# Patient Record
Sex: FEMALE | Race: WHITE | Employment: UNEMPLOYED | URBAN - METROPOLITAN AREA
[De-identification: names, ages, dates, MRNs, and addresses within clinical notes are randomized per-mention and may not be internally consistent; named-entity substitution may affect disease eponyms.]

---

## 2019-02-18 LAB
ALBUMIN SERPL-MCNC: 4.8 G/DL
ALP BLD-CCNC: 81 U/L
ALT SERPL-CCNC: 23 U/L
ANION GAP SERPL CALCULATED.3IONS-SCNC: 1.4 MMOL/L
AST SERPL-CCNC: 20 U/L
BILIRUB SERPL-MCNC: 0.8 MG/DL (ref 0.1–1.4)
BUN BLDV-MCNC: 14 MG/DL
CALCIUM SERPL-MCNC: 10.1 MG/DL
CHLORIDE BLD-SCNC: 100 MMOL/L
CHOLESTEROL, TOTAL: 184 MG/DL
CHOLESTEROL/HDL RATIO: 2.6
CO2: 26 MMOL/L
CREAT SERPL-MCNC: 0.7 MG/DL
GFR CALCULATED: 101
GLUCOSE BLD-MCNC: 91 MG/DL
HDLC SERPL-MCNC: 70 MG/DL (ref 35–70)
LDL CHOLESTEROL CALCULATED: 100 MG/DL (ref 0–160)
POTASSIUM SERPL-SCNC: 4.3 MMOL/L
SODIUM BLD-SCNC: 140 MMOL/L
TOTAL PROTEIN: 8.2
TRIGL SERPL-MCNC: 71 MG/DL
VLDLC SERPL CALC-MCNC: 14 MG/DL

## 2019-09-27 DIAGNOSIS — F41.9 ANXIETY: ICD-10-CM

## 2019-09-27 RX ORDER — NAPROXEN 500 MG/1
500 TABLET ORAL
COMMUNITY

## 2019-09-27 RX ORDER — LORAZEPAM 0.5 MG/1
0.5 TABLET ORAL 3 TIMES DAILY PRN
COMMUNITY
End: 2021-04-23 | Stop reason: SDUPTHER

## 2019-09-27 RX ORDER — MONTELUKAST SODIUM 10 MG/1
10 TABLET ORAL NIGHTLY
COMMUNITY
End: 2021-04-23 | Stop reason: ALTCHOICE

## 2019-09-27 RX ORDER — FLUTICASONE PROPIONATE 50 MCG
1 SPRAY, SUSPENSION (ML) NASAL DAILY
COMMUNITY

## 2020-11-09 ENCOUNTER — TELEPHONE (OUTPATIENT)
Dept: FAMILY MEDICINE CLINIC | Age: 50
End: 2020-11-09

## 2020-11-09 NOTE — TELEPHONE ENCOUNTER
Please call patient ---patient tested positive for COVID (at [facility] has some questions, could you please call her?

## 2020-11-11 ENCOUNTER — TELEPHONE (OUTPATIENT)
Dept: FAMILY MEDICINE CLINIC | Age: 50
End: 2020-11-11

## 2020-11-11 ENCOUNTER — APPOINTMENT (OUTPATIENT)
Dept: GENERAL RADIOLOGY | Age: 50
End: 2020-11-11
Payer: COMMERCIAL

## 2020-11-11 ENCOUNTER — HOSPITAL ENCOUNTER (EMERGENCY)
Age: 50
Discharge: HOME | End: 2020-11-11
Attending: EMERGENCY MEDICINE
Payer: COMMERCIAL

## 2020-11-11 ENCOUNTER — VIRTUAL VISIT (OUTPATIENT)
Dept: FAMILY MEDICINE CLINIC | Age: 50
End: 2020-11-11
Payer: COMMERCIAL

## 2020-11-11 VITALS
DIASTOLIC BLOOD PRESSURE: 95 MMHG | WEIGHT: 160 LBS | BODY MASS INDEX: 25.71 KG/M2 | OXYGEN SATURATION: 100 % | SYSTOLIC BLOOD PRESSURE: 148 MMHG | RESPIRATION RATE: 18 BRPM | HEART RATE: 98 BPM | TEMPERATURE: 98.3 F | HEIGHT: 66 IN

## 2020-11-11 PROCEDURE — 99284 EMERGENCY DEPT VISIT MOD MDM: CPT

## 2020-11-11 PROCEDURE — 99213 OFFICE O/P EST LOW 20 MIN: CPT | Performed by: PHYSICIAN ASSISTANT

## 2020-11-11 PROCEDURE — 71045 X-RAY EXAM CHEST 1 VIEW: CPT

## 2020-11-11 PROCEDURE — 6360000002 HC RX W HCPCS: Performed by: EMERGENCY MEDICINE

## 2020-11-11 RX ORDER — IPRATROPIUM BROMIDE AND ALBUTEROL SULFATE 2.5; .5 MG/3ML; MG/3ML
1 SOLUTION RESPIRATORY (INHALATION) ONCE
Status: DISCONTINUED | OUTPATIENT
Start: 2020-11-11 | End: 2020-11-11

## 2020-11-11 RX ORDER — DEXAMETHASONE 4 MG/1
8 TABLET ORAL EVERY 12 HOURS SCHEDULED
Status: DISCONTINUED | OUTPATIENT
Start: 2020-11-11 | End: 2020-11-11 | Stop reason: HOSPADM

## 2020-11-11 RX ORDER — METHYLPREDNISOLONE 4 MG/1
TABLET ORAL
Qty: 1 KIT | Refills: 0 | Status: SHIPPED | OUTPATIENT
Start: 2020-11-11 | End: 2020-11-17

## 2020-11-11 RX ADMIN — DEXAMETHASONE 8 MG: 4 TABLET ORAL at 20:46

## 2020-11-11 ASSESSMENT — ENCOUNTER SYMPTOMS
VOMITING: 0
RHINORRHEA: 1
GASTROINTESTINAL NEGATIVE: 1
SPUTUM PRODUCTION: 0
SINUS PRESSURE: 0
ABDOMINAL PAIN: 0
RHINORRHEA: 0
CHEST TIGHTNESS: 0
NAUSEA: 0
SINUS PAIN: 0
DIARRHEA: 0
EYE PAIN: 0
CONSTIPATION: 0
SORE THROAT: 0
CHEST TIGHTNESS: 0
EYES NEGATIVE: 1
VOMITING: 0
COUGH: 0
SINUS PAIN: 0
BACK PAIN: 0
SHORTNESS OF BREATH: 1
COUGH: 1
NAUSEA: 0
EYE REDNESS: 0
SHORTNESS OF BREATH: 1
ABDOMINAL PAIN: 0
SINUS PRESSURE: 0
FACIAL SWELLING: 0
SORE THROAT: 0
WHEEZING: 1
EYE DISCHARGE: 0

## 2020-11-11 NOTE — PROGRESS NOTES
2020    TELEHEALTH EVALUATION -- Audio/Visual (During [facility]UARC-44 public health emergency)    HPI:    Nat Almeida (:  1970) has requested an audio/video evaluation for the following concern(s):    Patient was tested for COVID on 20 at [facility] and it came back positive. Patient notes symptoms consisting of achiness, congestion, nasal congestion, shortness of breath and loss of taste and smell for the past 2 weeks. Overall feels better except for the shortness of breath which has gotten worse. Earlier today she had an episode where she felt very winded and lightheaded as well. She has h/o mild asthma that is currently only treated with albuterol. She did take her albuterol but did not help much with her symptoms. She feels better when resting. Never had fever. She is drinking plenty of fluids. Evaluation to date: none  Treatment to date: sudafed and mucinex but has not taken any today      Review of Systems   Constitutional: Negative for chills and fever. HENT: Positive for congestion. Negative for ear pain, postnasal drip, rhinorrhea, sinus pressure, sinus pain and sore throat. Respiratory: Positive for shortness of breath. Negative for cough and chest tightness. Cardiovascular: Negative for chest pain. Gastrointestinal: Negative for abdominal pain, nausea and vomiting. Musculoskeletal: Positive for myalgias. Neurological: Positive for light-headedness. Negative for headaches. Prior to Visit Medications    Medication Sig Taking? Authorizing Provider   fluticasone (ARNUITY ELLIPTA) 100 MCG/ACT AEPB Inhale into the lungs  Historical Provider, MD   LORazepam (ATIVAN) 0.5 MG tablet Take 0.5 mg by mouth 3 times daily as needed for Anxiety.   Historical Provider, MD   fluticasone (FLONASE) 50 MCG/ACT nasal spray 1 spray by Each Nostril route daily  Historical Provider, MD   naproxen (NAPROSYN) 500 MG tablet Take 500 mg by mouth 3 times daily (with meals)  Historical Provider, MD montelukast (SINGULAIR) 10 MG tablet Take 10 mg by mouth nightly  Historical Provider, MD       Social History     Tobacco Use    Smoking status: Former Smoker    Smokeless tobacco: Never Used   Substance Use Topics    Alcohol use: Yes     Comment: occ    Drug use: Never            PHYSICAL EXAMINATION:  [ INSTRUCTIONS:  \"[x]\" Indicates a positive item  \"[]\" Indicates a negative item  -- DELETE ALL ITEMS NOT EXAMINED]  Vital Signs: (As obtained by patient/caregiver or practitioner observation)    Blood pressure- 1st reading 139/87, 2nd reading 142/89   Heart rate- 105  Respiratory rate-      Temperature-    Pulse oximetry-     Constitutional:   [] Appears well-developed and well-nourished   [] No apparent distress    [x] Abnormal- appears ill but non-toxic    Mental status  [x] Alert and awake    [x] Oriented to person/place/time   [x]Able to follow commands      Eyes:  EOM    [x]  Normal  [] Abnormal-  Sclera  [x]  Normal  [] Abnormal -  Discharge [x]  None visible    [] Abnormal -    HENT:   [x] Normocephalic, atraumatic. [x] Mouth/Throat: Mucous membranes are moist.    [] Abnormal     External Ears   [x] Normal    [] Abnormal-     Neck:   [x] No visualized mass     Pulmonary/Chest:   [x] Respiratory effort normal.    [x] No visualized signs of difficulty breathing or respiratory distress        [] Abnormal-      Musculoskeletal:     [] Normal gait with no signs of ataxia   [x] Normal range of motion of neck        [] Abnormal-       Neurological:          [x] No Facial Asymmetry (Cranial nerve 7 motor function) (limited exam to video visit)     [x] No gaze palsy        [] Abnormal-         Skin:         [x] No significant exanthematous lesions or discoloration noted on facial skin         [] Abnormal-            Psychiatric:         [x] Normal Affect   [x] No Hallucinations        [] Abnormal-     Other pertinent observable physical exam findings-       ASSESSMENT/PLAN:  1. COVID-19  2.  SOB (shortness of breath)  3. Tachycardia  Discussed patient with Dr. Jaxson Dawson who recommended her be evaluated at the emergency department as he agreed that her worsening shortness of breath, elevated heart rate, and elevated blood pressure 10 days into the Covid illness could be a sign of post Covid complications. Patient was agreeable to go to the emergency department to rule out hypercoagulability, pulmonary embolism, pneumonia, inflammatory reaction. As explained at this point time they do not recommend any specific outpatient treatments such as steroids or antibiotics and that the recommended treatments are all once need to be administered in the hospital under monitoring. No follow-ups on file. Nat Almeida is a 44 y.o. female being evaluated by a Virtual Visit (video visit) encounter to address concerns as mentioned above. A caregiver was present when appropriate. Due to this being a TeleHealth encounter (During [facility]- public health emergency), evaluation of the following organ systems was limited: Vitals/Constitutional/EENT/Resp/CV/GI//MS/Neuro/Skin/Heme-Lymph-Imm. Pursuant to the emergency declaration under the [address] [place] [address], [address] [place] [address] authority and the [employer] and [employer] Act, this Virtual Visit was conducted with patient's (and/or legal guardian's) consent, to reduce the patient's risk of exposure to COVID-19 and provide necessary medical care. The patient (and/or legal guardian) has also been advised to contact this office for worsening conditions or problems, and seek emergency medical treatment and/or call 911 if deemed necessary. Services were provided through a video synchronous discussion virtually to substitute for in-person clinic visit. Patient and provider were located at their individual homes. --Tate Red PA-C on 11/11/2020 at 5:48 PM    An electronic signature was used to authenticate this note.

## 2020-11-12 ENCOUNTER — CARE COORDINATION (OUTPATIENT)
Dept: CARE COORDINATION | Age: 50
End: 2020-11-12

## 2020-11-12 NOTE — ED NOTES
Bed: E09  Expected date:   Expected time:   Means of arrival:   Comments:  COVID cleaning at [address] [place] [address]  11/11/20 1905

## 2020-11-12 NOTE — CARE COORDINATION
Patient contacted regarding recent visit for viral symptoms. This Annette Alvin contacted the patient by telephone to perform post discharge call. Verified name and  with patient as identifiers. Provided introduction to self, and reason for call due to viral symptoms of infection and/or exposure to COVID-19. Call within 2 business days of discharge: Yes       Patient presented to emergency department/flu clinic with complaints of viral symptoms/exposure to COVID. Patient reports symptoms are the same. Due to no new or worsening symptoms the RN CTN/ACTIANA was not notified for escalation. Discussed exposure protocols and quarantine with CDC Guidelines What To Do If You Are Sick    Patient was given an opportunity for questions and concerns. Stay home except to get medical care    Separate yourself from other people and animals in your home    Call ahead before visiting your doctor    Wear a facemask    Cover your coughs and sneezes    Clean your hands often    Avoid sharing personal household items    Clean all high-touch surfaces everyday    Monitor your symptoms  Seek prompt medical attention if your illness is worsening (e.g., difficulty breathing). Before seeking care, call your healthcare provider and tell them that you have, or are being evaluated for, COVID-19. Put on a facemask before you enter the facility. These steps will help the healthcare provider's office to keep other people in the office or waiting room from getting infected or exposed. Ask your healthcare provider to call the local or On license of [facility] health department. Persons who are placed under If you have a medical emergency and need to call 911, notify the dispatch personnel that you have, or are being evaluated for COVID-19. If possible, put on a facemask before emergency medical services arrive.     The patient agrees to contact the [facility] exposure line [phone], local health department [place] Department of Health: ([phone]) and PCP office for questions related to their healthcare. Author provided contact information for future reference. Patient/family/caregiver given information for [employer] and agrees to enroll no  Patient's preferred e-mail:    Patient's preferred phone number:   Based on Loop alert triggers, patient will be contacted by nurse care manager for worsening symptoms. Spoke to pt, pt is feeling about the same. Pt has not scheduled a follow up appt with PCP. Pt declined [place] [address] resources but was given the [facility] resource. Pt declined follow up appt next week but thank author for calling.      Fiorella Vera  [phone]

## 2020-11-12 NOTE — ED PROVIDER NOTES
[facility] ENCOUNTER      Pt Name: Cayetano Dunn  MRN: [id number]  [place] 1970  Date of evaluation: 11/11/2020  Provider: Tino Erazo DO    CHIEF COMPLAINT       Chief Complaint   Patient presents with    Shortness of Breath         HISTORY OF PRESENT ILLNESS      Cayetano Dunn is a 44 y.o. female who presents to the emergency department  for   Chief Complaint   Patient presents with    Shortness of Breath       78-year-old female presents emergency department with chief complaint of dyspnea that occurred after a shower this evening. Patient reports dyspnea lasted for approximately 30 minutes and is now resolved. Patient tested positive for COVID-19 1 week ago. Patient denies fever, chest pain or other associated symptoms. The history is provided by the patient and medical records. No  was used. Shortness of Breath   Severity:  Moderate  Onset quality:  Sudden  Duration:  30 minutes  Timing:  Sporadic  Progression:  Partially resolved  Chronicity:  New  Context: URI    Relieved by:  None tried  Worsened by:  Nothing  Ineffective treatments:  None tried  Associated symptoms: cough and wheezing    Associated symptoms: no abdominal pain, no chest pain, no fever, no headaches, no rash, no sore throat, no sputum production and no vomiting    Risk factors: no obesity, no oral contraceptive use and no tobacco use          Nursing Notes, Triage Notes & Vital Signs were reviewed. REVIEW OF SYSTEMS    (2-9 systems for level 4, 10 or more for level 5)     Review of Systems   Constitutional: Negative. Negative for chills, fatigue and fever. HENT: Positive for congestion and rhinorrhea. Negative for dental problem, facial swelling, nosebleeds, postnasal drip, sinus pressure, sinus pain and sore throat. Eyes: Negative. Negative for pain, discharge, redness and visual disturbance.    Respiratory: Positive for cough, shortness of breath and wheezing. Negative for sputum production and chest tightness. Cardiovascular: Negative. Negative for chest pain and palpitations. Gastrointestinal: Negative. Negative for abdominal pain, constipation, diarrhea, nausea and vomiting. Genitourinary: Negative. Negative for dysuria, flank pain, frequency, hematuria and urgency. Musculoskeletal: Negative. Negative for arthralgias, back pain and myalgias. Skin: Negative. Negative for rash. Neurological: Negative. Negative for dizziness, speech difficulty, light-headedness, numbness and headaches. Psychiatric/Behavioral: Negative. Negative for agitation, confusion and self-injury. The patient is not nervous/anxious. All other systems reviewed and are negative. Except as noted above the remainder of the review of systems was reviewed and negative. PAST MEDICAL HISTORY     Past Medical History:   Diagnosis Date    Anxiety     Asthma        Prior to Admission medications    Medication Sig Start Date End Date Taking? Authorizing Provider   methylPREDNISolone (MEDROL DOSEPACK) 4 MG tablet Take by mouth. 11/11/20 11/17/20 Yes Mercedes Kirk, DO   fluticasone (ARNUITY ELLIPTA) 100 MCG/ACT AEPB Inhale into the lungs    Historical Provider, MD   LORazepam (ATIVAN) 0.5 MG tablet Take 0.5 mg by mouth 3 times daily as needed for Anxiety. Historical Provider, MD   fluticasone (FLONASE) 50 MCG/ACT nasal spray 1 spray by Each Nostril route daily    Historical Provider, MD   naproxen (NAPROSYN) 500 MG tablet Take 500 mg by mouth 3 times daily (with meals)    Historical Provider, MD   montelukast (SINGULAIR) 10 MG tablet Take 10 mg by mouth nightly    Historical Provider, MD        Patient Active Problem List   Diagnosis    Anxiety    Asthma         SURGICAL HISTORY     History reviewed. No pertinent surgical history.       CURRENT MEDICATIONS       Discharge Medication List as of 11/11/2020  8:32 PM      CONTINUE these medications which have NOT CHANGED    Details   fluticasone (ARNUITY ELLIPTA) 100 MCG/ACT AEPB Inhale into the lungsHistorical Med      LORazepam (ATIVAN) 0.5 MG tablet Take 0.5 mg by mouth 3 times daily as needed for Anxiety. Historical Med      fluticasone (FLONASE) 50 MCG/ACT nasal spray 1 spray by Each Nostril route dailyHistorical Med      naproxen (NAPROSYN) 500 MG tablet Take 500 mg by mouth 3 times daily (with meals)Historical Med      montelukast (SINGULAIR) 10 MG tablet Take 10 mg by mouth nightlyHistorical Med             ALLERGIES     Patient has no known allergies.     FAMILY HISTORY       Family History   Problem Relation Age of Onset    Cancer Mother           SOCIAL HISTORY       Social History     Socioeconomic History    Marital status:      Spouse name: None    Number of children: None    Years of education: None    Highest education level: None   Occupational History    None   Social Needs    Financial resource strain: None    Food insecurity     Worry: None     Inability: None    Transportation needs     Medical: None     Non-medical: None   Tobacco Use    Smoking status: Former Smoker    Smokeless tobacco: Never Used   Substance and Sexual Activity    Alcohol use: Yes     Comment: occ    Drug use: Never    Sexual activity: None   Lifestyle    Physical activity     Days per week: None     Minutes per session: None    Stress: None   Relationships    Social connections     Talks on phone: None     Gets together: None     Attends [employer] service: None     Active member of club or organization: None     Attends meetings of clubs or organizations: None     Relationship status: None    Intimate partner violence     Fear of current or ex partner: None     Emotionally abused: None     Physically abused: None     Forced sexual activity: None   Other Topics Concern    None   Social History Narrative    None       SCREENINGS    [place] Coma Scale  Eye Opening: Spontaneous  Best Verbal Response: refill takes less than 2 seconds. Coloration: Skin is not pale. Findings: No erythema or rash. Neurological:      Mental Status: She is alert and oriented to person, place, and time. Cranial Nerves: No cranial nerve deficit. Sensory: No sensory deficit. Motor: No abnormal muscle tone. Coordination: Coordination normal.      Deep Tendon Reflexes: Reflexes normal.   Psychiatric:         Behavior: Behavior normal.         Thought Content: Thought content normal.         Judgment: Judgment normal.         DIAGNOSTIC RESULTS     Labs Reviewed - No data to display       EKG: All EKG's are interpreted by the Emergency Department Physician who either signs or Co-signs this chart in the absence of a cardiologist.       EKG Interpretation    Interpreted by emergency department physician    Adalid Sawyer:     Non-plain film images such as CT, Ultrasound and MRI are read by the radiologist. Plain radiographic images are visualized and preliminarily interpreted by the emergency physician. Interpretation per the Radiologist below, if available at the time of this note:    XR CHEST PORTABLE   Final Result   No acute cardiopulmonary abnormality identified. Streaky atelectasis and/or scarring in the right mid lung and left lung base. ED BEDSIDE ULTRASOUND:   Performed by ED Physician Loretta Hatch DO       LABS:  Labs Reviewed - No data to display    All other labs were within normal range or not returned as of this dictation.     EMERGENCY DEPARTMENT COURSE and DIFFERENTIAL DIAGNOSIS/MDM:   Vitals:    Vitals:    11/11/20 1919   BP: (!) 148/95   Pulse: 98   Resp: 18   Temp: 98.3 °F (36.8 °C)   TempSrc: Tympanic   SpO2: 100%   Weight: 160 lb (72.6 kg)   Height: 5' 6\" (1.676 m)           MDM  Number of Diagnoses or Management Options  COVID-19:   Shortness of breath:   Diagnosis management comments: 64-year-old female presents emergency department with chief complaint of dyspnea that is resolved at this time. Patient reports dyspnea occurred after being in the shower. Patient tested positive for COVID-19 1 week ago. Vital signs are stable and normal.  Patient has no risk factors for pulmonary embolism. Chest x-ray is negative for interval development of pneumonia. Patient was given Decadron. Patient has albuterol at home. Will discharge home with return precautions and primary care follow-up. Patient is to continue self quarantine until cleared. Amount and/or Complexity of Data Reviewed  Tests in the radiology section of CPT®: ordered and reviewed    Risk of Complications, Morbidity, and/or Mortality  Presenting problems: moderate  Diagnostic procedures: moderate  Management options: moderate    Critical Care  Total time providing critical care: < 30 minutes    Patient Progress  Patient progress: improved        REASSESSMENT          CRITICAL CARE TIME     This excludes seperately billable procedures and family discussion time. Critical care time provided for obtaining history, conducting a physical exam, performing and monitoring interventions, ordering, collecting and interpreting tests, and establishing medical decision-making. There was a potential for life/limb threatening pathology requiring close evaluation and intervention with concern for patient decompensation. CONSULTS:  None    PROCEDURES:  None performed unless otherwise noted below     Procedures        FINAL IMPRESSION      1. COVID-19    2.  Shortness of breath          DISPOSITION/PLAN   DISPOSITION Decision To Discharge 11/11/2020 08:17:30 PM      PATIENT REFERRED TO:  Karolyn Anna MD  [address] [id number] [phone]    In 3 days  If symptoms worsen      DISCHARGE MEDICATIONS:  Discharge Medication List as of 11/11/2020  8:32 PM      START taking these medications    Details   methylPREDNISolone (MEDROL DOSEPACK) 4 MG tablet Take by mouth., Disp-1 kit,R-0Print             ED Provider Disposition Time  DISPOSITION Decision To Discharge 11/11/2020 08:17:30 PM      Appropriate personal protective equipment was worn during the patient's evaluation. These included surgical, eye protection, surgical mask or in 95 respirator and gloves. The patient was also placed in a surgical mask for the prevention of possible spread of respiratory viral illnesses. The Patient was instructed to read the package inserts with any medication that was prescribed. Major potential reactions and medication interactions were discussed. The Patient understands that there are numerous possible adverse reactions not covered. The patient was also instructed to arrange follow-up with his or her primary care provider for review of any pending labwork or incidental findings on any radiology results that were obtained. All efforts were made to discuss any incidental findings that require further monitoring. Controlled Substances Monitoring:     No flowsheet data found.     (Please note that portions of this note were completed with a voice recognition program.  Efforts were made to edit the dictations but occasionally words are mis-transcribed.)    Lianet Snell DO (electronically signed)  Attending Emergency Physician            Lianet Snell DO  11/11/20 0305

## 2020-11-12 NOTE — ED TRIAGE NOTES
Pt presents to ED with c/o an episode of shortness of breath after a shower today. Pt states she tested positive for covid a little over a week ago.

## 2020-11-12 NOTE — ED NOTES
Pt states she called her family doctor and he recommended going to the ED to rule out pneumonia.      Carlton Novoa RN  11/11/20 1921

## 2021-04-22 ENCOUNTER — TELEPHONE (OUTPATIENT)
Dept: FAMILY MEDICINE CLINIC | Age: 51
End: 2021-04-22

## 2021-04-23 ENCOUNTER — OFFICE VISIT (OUTPATIENT)
Dept: FAMILY MEDICINE CLINIC | Age: 51
End: 2021-04-23
Payer: COMMERCIAL

## 2021-04-23 VITALS
HEART RATE: 94 BPM | BODY MASS INDEX: 28.64 KG/M2 | SYSTOLIC BLOOD PRESSURE: 120 MMHG | WEIGHT: 178.2 LBS | DIASTOLIC BLOOD PRESSURE: 72 MMHG | TEMPERATURE: 97.2 F | HEIGHT: 66 IN | OXYGEN SATURATION: 97 %

## 2021-04-23 DIAGNOSIS — Z12.11 SCREEN FOR COLON CANCER: ICD-10-CM

## 2021-04-23 DIAGNOSIS — F41.0 PANIC DISORDER: Primary | ICD-10-CM

## 2021-04-23 PROCEDURE — 99396 PREV VISIT EST AGE 40-64: CPT | Performed by: FAMILY MEDICINE

## 2021-04-23 RX ORDER — FLUTICASONE FUROATE 100 UG/1
POWDER RESPIRATORY (INHALATION)
Qty: 1 EACH | Refills: 3 | Status: SHIPPED | OUTPATIENT
Start: 2021-04-23

## 2021-04-23 RX ORDER — LORAZEPAM 0.5 MG/1
0.5 TABLET ORAL 3 TIMES DAILY PRN
Qty: 30 TABLET | Refills: 0 | Status: SHIPPED | OUTPATIENT
Start: 2021-04-23 | End: 2021-05-05

## 2021-04-23 RX ORDER — ALBUTEROL SULFATE 90 UG/1
2 AEROSOL, METERED RESPIRATORY (INHALATION) EVERY 6 HOURS PRN
Qty: 1 INHALER | Refills: 3 | Status: SHIPPED | OUTPATIENT
Start: 2021-04-23

## 2021-04-23 ASSESSMENT — PATIENT HEALTH QUESTIONNAIRE - PHQ9
SUM OF ALL RESPONSES TO PHQ QUESTIONS 1-9: 0
SUM OF ALL RESPONSES TO PHQ QUESTIONS 1-9: 0
1. LITTLE INTEREST OR PLEASURE IN DOING THINGS: 0
2. FEELING DOWN, DEPRESSED OR HOPELESS: 0

## 2021-04-23 NOTE — PROGRESS NOTES
2021     Misty Nava      Chief Complaint   Patient presents with   Gideon Gonzales Appointment Request, Routine     pt states that she wants to talk to  about colonoscopy .  Asthma     pt states that she has had some SOB       HPI      Puma Olivia is a 44 y.o. female who presents today with the followin. Panic disorder    2. Screen for colon cancer    Patient is here for several things  She had a Covid infection was moderately ill did not require hospitalization  She has underlying asthma and is seeing an allergist she is on controller medicines with inhaled corticosteroid and uses ProAir for rescue  She was on Singulair but that is been stopped  He complains now some shortness of breath is mild is not associated with wheezing      REVIEW OF SYMPTOMS    Review of Systems   Gastrointestinal:        She is asking about need for screening colonoscopy   Psychiatric/Behavioral: The patient is nervous/anxious.          Patient has had a problem with panic disorder in the past  She did have a severe episode of this years ago  She has not just infrequently she is asking she could have some Ativan as needed I gave her 15 with no refills       PAST MEDICAL HISTORY  Past Medical History:   Diagnosis Date    Anxiety     Asthma        FAMILY HISTORY  Family History   Problem Relation Age of Onset    Cancer Mother        SOCIAL HISTORY  Social History     Socioeconomic History    Marital status:      Spouse name: None    Number of children: None    Years of education: None    Highest education level: None   Occupational History    None   Social Needs    Financial resource strain: None    Food insecurity     Worry: None     Inability: None    Transportation needs     Medical: None     Non-medical: None   Tobacco Use    Smoking status: Former Smoker    Smokeless tobacco: Never Used   Substance and Sexual Activity    Alcohol use: Yes     Comment: occ    Drug use: Never    Sexual activity: None   Lifestyle    Physical activity     Days per week: None     Minutes per session: None    Stress: None   Relationships    Social connections     Talks on phone: None     Gets together: None     Attends [employer] service: None     Active member of club or organization: None     Attends meetings of clubs or organizations: None     Relationship status: None    Intimate partner violence     Fear of current or ex partner: None     Emotionally abused: None     Physically abused: None     Forced sexual activity: None   Other Topics Concern    None   Social History Narrative    None        SURGICAL HISTORY  No past surgical history on file. CURRENT MEDICATIONS  Current Outpatient Medications   Medication Sig Dispense Refill    sodium chloride nebulizer 0.9 % NEBU 30 mL with albuterol (5 MG/ML) 0.5% NEBU Inhale 2.5 mg into the lungs once      LORazepam (ATIVAN) 0.5 MG tablet Take 1 tablet by mouth 3 times daily as needed for Anxiety for up to 12 days. 30 tablet 0    fluticasone (ARNUITY ELLIPTA) 100 MCG/ACT AEPB Inhale into the lungs 1 time a day 1 each 3    naproxen (NAPROSYN) 500 MG tablet Take 500 mg by mouth 3 times daily (with meals)      fluticasone (FLONASE) 50 MCG/ACT nasal spray 1 spray by Each Nostril route daily       No current facility-administered medications for this visit. ALLERGIES  No Known Allergies    PHYSICAL EXAM    /72   Pulse 94   Temp 97.2 °F (36.2 °C)   Ht 5' 6\" (1.676 m)   Wt 178 lb 3.2 oz (80.8 kg)   SpO2 97%   BMI 28.76 kg/m²     Physical Exam  Vitals signs and nursing note reviewed. Constitutional:       Appearance: Normal appearance. Cardiovascular:      Rate and Rhythm: Normal rate. Pulmonary:      Effort: Pulmonary effort is normal.     Reviewed immunizations  Reviewed PDMP  Reviewed most recent labs      ASSESSMENT and Barry Walker was seen today for appointment request, routine and asthma.     Diagnoses and all orders for this visit:    Panic disorder  -     LORazepam

## 2022-01-06 ENCOUNTER — HOSPITAL ENCOUNTER (OUTPATIENT)
Dept: GENERAL RADIOLOGY | Age: 52
Discharge: HOME | End: 2022-01-06
Payer: COMMERCIAL

## 2022-01-06 ENCOUNTER — HOSPITAL ENCOUNTER (OUTPATIENT)
Age: 52
Discharge: HOME | End: 2022-01-06
Payer: COMMERCIAL

## 2022-01-06 DIAGNOSIS — R06.00 DYSPNEA, UNSPECIFIED TYPE: ICD-10-CM

## 2022-01-06 PROCEDURE — 71046 X-RAY EXAM CHEST 2 VIEWS: CPT

## 2022-09-20 ENCOUNTER — APPOINTMENT (OUTPATIENT)
Dept: CT IMAGING | Age: 52
End: 2022-09-20
Payer: COMMERCIAL

## 2022-09-20 ENCOUNTER — APPOINTMENT (OUTPATIENT)
Dept: GENERAL RADIOLOGY | Age: 52
End: 2022-09-20
Payer: COMMERCIAL

## 2022-09-20 ENCOUNTER — HOSPITAL ENCOUNTER (EMERGENCY)
Age: 52
Discharge: HOME | End: 2022-09-20
Payer: COMMERCIAL

## 2022-09-20 VITALS
WEIGHT: 170 LBS | BODY MASS INDEX: 27.32 KG/M2 | TEMPERATURE: 98.2 F | HEART RATE: 106 BPM | SYSTOLIC BLOOD PRESSURE: 135 MMHG | RESPIRATION RATE: 20 BRPM | OXYGEN SATURATION: 96 % | DIASTOLIC BLOOD PRESSURE: 97 MMHG | HEIGHT: 66 IN

## 2022-09-20 DIAGNOSIS — F41.9 ANXIETY: ICD-10-CM

## 2022-09-20 DIAGNOSIS — M54.50 ACUTE LOW BACK PAIN, UNSPECIFIED BACK PAIN LATERALITY, UNSPECIFIED WHETHER SCIATICA PRESENT: ICD-10-CM

## 2022-09-20 DIAGNOSIS — S52.502A CLOSED FRACTURE OF DISTAL END OF LEFT RADIUS, UNSPECIFIED FRACTURE MORPHOLOGY, INITIAL ENCOUNTER: Primary | ICD-10-CM

## 2022-09-20 DIAGNOSIS — T07.XXXA MULTIPLE CONTUSIONS: ICD-10-CM

## 2022-09-20 PROCEDURE — 29125 APPL SHORT ARM SPLINT STATIC: CPT

## 2022-09-20 PROCEDURE — 73110 X-RAY EXAM OF WRIST: CPT

## 2022-09-20 PROCEDURE — 6370000000 HC RX 637 (ALT 250 FOR IP): Performed by: NURSE PRACTITIONER

## 2022-09-20 PROCEDURE — 99284 EMERGENCY DEPT VISIT MOD MDM: CPT

## 2022-09-20 PROCEDURE — 72131 CT LUMBAR SPINE W/O DYE: CPT

## 2022-09-20 RX ORDER — METHOCARBAMOL 500 MG/1
750 TABLET, FILM COATED ORAL ONCE
Status: DISCONTINUED | OUTPATIENT
Start: 2022-09-20 | End: 2022-09-20

## 2022-09-20 RX ORDER — METHOCARBAMOL 500 MG/1
750 TABLET, FILM COATED ORAL 4 TIMES DAILY
Status: DISCONTINUED | OUTPATIENT
Start: 2022-09-20 | End: 2022-09-20 | Stop reason: HOSPADM

## 2022-09-20 RX ORDER — METHOCARBAMOL 750 MG/1
750 TABLET, FILM COATED ORAL 4 TIMES DAILY
Qty: 20 TABLET | Refills: 0 | Status: SHIPPED | OUTPATIENT
Start: 2022-09-20 | End: 2022-09-25

## 2022-09-20 RX ORDER — METHOCARBAMOL 500 MG/1
1000 TABLET, FILM COATED ORAL 4 TIMES DAILY
Status: DISCONTINUED | OUTPATIENT
Start: 2022-09-20 | End: 2022-09-20

## 2022-09-20 RX ORDER — LIDOCAINE 4 G/G
1 PATCH TOPICAL DAILY
Qty: 30 PATCH | Refills: 0 | Status: SHIPPED | OUTPATIENT
Start: 2022-09-20 | End: 2022-10-20

## 2022-09-20 RX ORDER — HYDROCODONE BITARTRATE AND ACETAMINOPHEN 5; 325 MG/1; MG/1
1 TABLET ORAL EVERY 6 HOURS PRN
Qty: 10 TABLET | Refills: 0 | Status: SHIPPED | OUTPATIENT
Start: 2022-09-20 | End: 2022-09-23

## 2022-09-20 RX ORDER — ONDANSETRON 4 MG/1
4 TABLET, FILM COATED ORAL EVERY 8 HOURS PRN
Qty: 10 TABLET | Refills: 0 | Status: SHIPPED | OUTPATIENT
Start: 2022-09-20

## 2022-09-20 RX ORDER — HYDROCODONE BITARTRATE AND ACETAMINOPHEN 5; 325 MG/1; MG/1
1 TABLET ORAL EVERY 6 HOURS PRN
Status: DISCONTINUED | OUTPATIENT
Start: 2022-09-20 | End: 2022-09-20 | Stop reason: HOSPADM

## 2022-09-20 RX ADMIN — METHOCARBAMOL TABLETS 1000 MG: 500 TABLET, COATED ORAL at 14:29

## 2022-09-20 RX ADMIN — HYDROCODONE BITARTRATE AND ACETAMINOPHEN 1 TABLET: 5; 325 TABLET ORAL at 11:35

## 2022-09-20 ASSESSMENT — ENCOUNTER SYMPTOMS
DIARRHEA: 0
TROUBLE SWALLOWING: 0
EYE REDNESS: 0
ABDOMINAL PAIN: 0
SORE THROAT: 0
VOMITING: 0
EYE PAIN: 0
NAUSEA: 0
BACK PAIN: 1
SHORTNESS OF BREATH: 0

## 2022-09-20 ASSESSMENT — PAIN SCALES - GENERAL
PAINLEVEL_OUTOF10: 10
PAINLEVEL_OUTOF10: 8
PAINLEVEL_OUTOF10: 10

## 2022-09-20 ASSESSMENT — PAIN DESCRIPTION - ORIENTATION
ORIENTATION: LEFT
ORIENTATION: LEFT

## 2022-09-20 ASSESSMENT — PAIN DESCRIPTION - LOCATION
LOCATION: ARM;HAND
LOCATION: BACK;WRIST
LOCATION: BACK;WRIST

## 2022-09-20 NOTE — ED PROVIDER NOTES
Negative for neck pain. Skin:  Negative for rash. Neurological:  Negative for speech difficulty, weakness, numbness and headaches. Psychiatric/Behavioral:  Negative for behavioral problems. PAST MEDICAL HISTORY     Past Medical History:   Diagnosis Date    Anxiety     Asthma        SURGICAL HISTORY   History reviewed. No pertinent surgical history. [id number]       Discharge Medication List as of 9/20/2022  2:54 PM        CONTINUE these medications which have NOT CHANGED    Details   sodium chloride nebulizer 0.9 % NEBU 30 mL with albuterol (5 MG/ML) 0.5% NEBU Inhale 2.5 mg into the lungs onceHistorical Med      fluticasone (ARNUITY ELLIPTA) 100 MCG/ACT AEPB Inhale into the lungs 1 time a day, Disp-1 each, R-3Normal      albuterol sulfate  (90 Base) MCG/ACT inhaler Inhale 2 puffs into the lungs every 6 hours as needed for Wheezing, Disp-1 Inhaler, R-3Normal      fluticasone (FLONASE) 50 MCG/ACT nasal spray 1 spray by Each Nostril route dailyHistorical Med      naproxen (NAPROSYN) 500 MG tablet Take 500 mg by mouth 3 times daily (with meals)Historical Med             ALLERGIES     Patient has no known allergies.     FAMILYHISTORY       Family History   Problem Relation Age of Onset    Cancer Mother         SOCIAL HISTORY       Social History     Socioeconomic History    Marital status:      Spouse name: None    Number of children: None    Years of education: None    Highest education level: None   Tobacco Use    Smoking status: Former    Smokeless tobacco: Never   Substance and Sexual Activity    Alcohol use: Yes     Comment: occ    Drug use: Never       SCREENINGS    [place] Coma Scale  Eye Opening: Spontaneous  Best Verbal Response: Oriented  Best Motor Response: Obeys commands  [place] Coma Scale Score: 15        PHYSICAL EXAM    (up to 7 for level 4, 8 or more for level 5)     ED Triage Vitals [09/20/22 0944]   BP Temp Temp Source Heart Rate Resp SpO2 Height Weight   (!) 135/97 98.2 °F (36.8 °C) Oral (!) 106 20 96 % -- --       Physical Exam  Vitals and nursing note reviewed. Constitutional:       General: She is not in acute distress. Appearance: She is not ill-appearing or toxic-appearing. HENT:      Head: Normocephalic and atraumatic. Right Ear: External ear normal.      Left Ear: External ear normal.      Nose: Nose normal.      Mouth/Throat:      Mouth: Mucous membranes are moist.   Eyes:      General: No scleral icterus. Extraocular Movements: Extraocular movements intact. Conjunctiva/sclera: Conjunctivae normal.   Cardiovascular:      Rate and Rhythm: Normal rate and regular rhythm. Pulses: Normal pulses. Heart sounds: Normal heart sounds. No murmur heard. No gallop. Pulmonary:      Effort: Pulmonary effort is normal. No respiratory distress. Breath sounds: Normal breath sounds. No stridor. No wheezing, rhonchi or rales. Abdominal:      General: There is no distension. Palpations: Abdomen is soft. Tenderness: There is no abdominal tenderness. There is no guarding. Musculoskeletal:         General: Swelling (Left wrist), tenderness, deformity and signs of injury present. Normal range of motion. Cervical back: Normal range of motion and neck supple. No tenderness. Skin:     General: Skin is warm and dry. Capillary Refill: Capillary refill takes less than 2 seconds. Neurological:      General: No focal deficit present. Mental Status: She is alert and oriented to person, place, and time. Psychiatric:         Mood and Affect: Mood normal.       DIAGNOSTIC RESULTS   LABS:    Labs Reviewed - No data to display    When ordered, only abnormal lab results are displayed. All other labs were within normal range or not returned as of this dictation. EKG:  When ordered, EKG's are interpreted by the Emergency Department Physician in the absence of a cardiologist.  Please see their note for interpretation of EKG.    RADIOLOGY:   Non-plain film images such as CT, Ultrasound and MRI are read by the radiologist. [place] radiographic images are visualized andpreliminarily interpreted by the  ED Provider with the below findings:    Per Radiologist interpretation below, if available at the time of this note:    CT LUMBAR SPINE WO CONTRAST   Final Result   No acute osseous abnormality . XR WRIST LEFT (MIN 3 VIEWS)   Final Result   Comminuted impacted fracture distal radius           XR WRIST LEFT (MIN 3 VIEWS)    Result Date: 9/20/2022  EXAMINATION: 3 XRAY VIEWS OF THE LEFT WRIST 9/20/2022 10:40 am COMPARISON: None. HISTORY: ORDERING SYSTEM PROVIDED HISTORY: fall TECHNOLOGIST PROVIDED HISTORY: Reason for exam:->fall Reason for Exam: pain and deformity after fall FINDINGS: Comminuted impacted fracture distal radius. No dislocation. No other fractures. Comminuted impacted fracture distal radius         PROCEDURES   Unless otherwise noted below, none     Procedures    CRITICAL CARE TIME     N/A  CONSULTS:  None      EMERGENCY DEPARTMENT COURSE and DIFFERENTIAL DIAGNOSIS/MDM:   Vitals:    Vitals:    09/20/22 0944 09/20/22 1420   BP: (!) 135/97    Pulse: (!) 106    Resp: 20    Temp: 98.2 °F (36.8 °C)    TempSrc: Oral    SpO2: 96%    Weight:  170 lb (77.1 kg)   Height:  5' 6\" (1.676 m)       Is this patient to be included in the SEP-1 Core Measure due to severe sepsis or septic shock? No    This is an alert and oriented x4, 46 y.o. yo female who presents emergency department with drainage that she sustained after mechanical fall today. .  Patient has nonlabored respirations. Vital signs notable for tachycardia at 106. This is most likely related to her discomfort. Other vital signs within acceptable parameters. Patient is afebrile. She is in discomfort related to the back and left wrist.  There is obvious deformity to the left wrist.  However radial pulses are present. She has good distal motor and sensory.   Cap refill is intact and less than 2 seconds. There is no pain with palpation to the midline back or paraspinal area. No pain in the hips. Although I cannot elicit any pain with palpation she does continue to report low back pain. No focal neurodeficits are noted. As result CT is obtained. Assessment as noted above imaging of the left wrist reveals a comminuted impacted fracture of the distal radius. Patient was given the following medications:  Medications - No data to display    A splint was applied by the ED staff under my supervision. After application, I evaluated Enoc Kuo and deemed the affected area to be satisfactorily immobilized. Her distal neuro-vascular exam remained unchanged from pre-splint application and without evidence of compartment syndrome. The patient tolerated the procedure well and without acute complications. Imaging of the lumbar spine is without any acute findings. She has no abrasions, lacerations hematomas bruising or obvious deformities noted to her back or trunk. Patient treated in the emergency department as noted. All results have been reviewed with the patient and her  is at bedside. She has been treated with medications as noted. She is instructed to follow-up with Dr. Suzette Conde. Patient and her  verbalized understanding. She is to return to the emergency department with any worsening symptoms. Discharge time out:          FINAL IMPRESSION      1. Closed fracture of distal end of left radius, unspecified fracture morphology, initial encounter    2. Anxiety    3. Multiple contusions    4. Acute low back pain, unspecified back pain laterality, unspecified whether sciatica present          DISPOSITION/PLAN   DISPOSITION Decision To Discharge 09/20/2022 02:37:08 PM      PATIENT REFERREDTO:  Koki Franklin DO  [address] [facility]  [address]  [facility]   [phone]          DISCHARGE MEDICATIONS:  Discharge Medication List as of 9/20/2022 2:54 PM        START taking these medications    Details   lidocaine 4 % external patch Place 1 patch onto the skin daily, TransDERmal, DAILY Starting Tue 9/20/2022, Until Thu 10/20/2022, For 30 days, Disp-30 patch, R-0, Normal      HYDROcodone-acetaminophen (NORCO) 5-325 MG per tablet Take 1 tablet by mouth every 6 hours as needed for Pain for up to 3 days. Intended supply: 3 days. Take lowest dose possible to manage pain, Disp-10 tablet, R-0Normal      ondansetron (ZOFRAN) 4 MG tablet Take 1 tablet by mouth every 8 hours as needed for Nausea, Disp-10 tablet, R-0Normal      methocarbamol (ROBAXIN) 750 MG tablet Take 1 tablet by mouth 4 times daily for 5 days, Disp-20 tablet, R-0Normal             DISCONTINUED MEDICATIONS:         Comment: Please note this report has been produced using speech recognition software and may contain errors related to that system including errors in grammar, punctuation, and spelling, as well as words and phrases that may be inappropriate. If there are any questions or concerns please feel free to contact the dictating provider for clarification.     ELDER Lehman CNP (electronically signed)      ELDER Lehman CNP  09/20/22 [id number]

## 2022-09-20 NOTE — ED NOTES
Pt to CT via stretcher at this time. Per CT tech, extended wait time due to higher acuity patients.     VERO Richard  09/20/22 [id number]

## 2022-09-20 NOTE — ED NOTES
Patient discharging home, AVS reviewed no questions at this time. Patient instructed to follow up per discharge instructions and to return for worsening symptoms. Respirations equal and unlabored, skin PWD.      Alex Kennedy RN  09/20/22 3588

## 2022-09-21 ENCOUNTER — OFFICE VISIT (OUTPATIENT)
Dept: ORTHOPEDIC SURGERY | Age: 52
End: 2022-09-21
Payer: COMMERCIAL

## 2022-09-21 VITALS
BODY MASS INDEX: 28.99 KG/M2 | HEIGHT: 66 IN | WEIGHT: 180.4 LBS | DIASTOLIC BLOOD PRESSURE: 90 MMHG | SYSTOLIC BLOOD PRESSURE: 140 MMHG

## 2022-09-21 DIAGNOSIS — S52.572A OTHER CLOSED INTRA-ARTICULAR FRACTURE OF DISTAL END OF LEFT RADIUS, INITIAL ENCOUNTER: Primary | ICD-10-CM

## 2022-09-21 PROCEDURE — 99203 OFFICE O/P NEW LOW 30 MIN: CPT | Performed by: STUDENT IN AN ORGANIZED HEALTH CARE EDUCATION/TRAINING PROGRAM

## 2022-09-21 PROCEDURE — 29075 APPL CST ELBW FNGR SHORT ARM: CPT | Performed by: STUDENT IN AN ORGANIZED HEALTH CARE EDUCATION/TRAINING PROGRAM

## 2022-09-21 ASSESSMENT — ENCOUNTER SYMPTOMS
FACIAL SWELLING: 0
ABDOMINAL PAIN: 0
COLOR CHANGE: 0
BACK PAIN: 0
NAUSEA: 0
STRIDOR: 0
SHORTNESS OF BREATH: 0
WHEEZING: 0
PHOTOPHOBIA: 0
EYE PAIN: 0
VOMITING: 0
COUGH: 0
EYE REDNESS: 0

## 2022-09-21 NOTE — PROGRESS NOTES
9/21/2022   Chief Complaint   Patient presents with    Wrist Injury     Patient is here after a falling incident down stairs, seen in ED and DOI  9.21.22. History of Present Illness:                             Nick Baxter is a 45 y.o. female right hand-dominant male referred by emergency room for evaluation and treatment of a left wrist injury. Patient states this occurred yesterday when she fell down the last 3-4 steps in her home landing on her outstretched left arm. She also hit her low back on the steps. She had immediate pain over the left wrist and was brought to the emergency room. The pain is currently rated moderate. The patient was originally seen at local emergency room where an x-ray was done, a fracture of the distal radius identified and she  was placed in a ulnar gutter splint. The patient was subsequently referred to Orthopedics for further management. The splint has remained in place and is clean and dry. She  denies prior injury to left wrist, denies associated injuries, prior injury to the upper extremity. Denies numbness, tingling, fever, chills. Related history: negative for prior surgery, additional trauma, arthritis or disorders. Is affecting ADLs. Pain is 10/10 at it's worst.    Outside reports reviewed: Emergency room note and imaging reviewed    Patient's medications, allergies, past medical, surgical, social and family histories were reviewed and updated as appropriate. Patient has had no other treatment thus far including CSI, PT, NSAIDs for pain relief. She has been receiving Norco for pain relief. MA HPI: Patient is here for ED Follow up after a falling incident 9/20/22. ED DX Closed fracture of distal end of left radius, unspecified fracture morphology, initial encounter. Intermittent numbness and tingling. Pain rated 8/10. Described as aching, throbbing and generalized.      Medical History  Patient's medications, allergies, past medical, surgical, social and family histories were reviewed and updated as appropriate. Past Medical History:   Diagnosis Date    Anxiety     Asthma      No past surgical history on file. Family History   Problem Relation Age of Onset    Cancer Mother      Social History     Socioeconomic History    Marital status:    Tobacco Use    Smoking status: Former    Smokeless tobacco: Never   Substance and Sexual Activity    Alcohol use: Yes     Comment: occ    Drug use: Never     Current Outpatient Medications   Medication Sig Dispense Refill    lidocaine 4 % external patch Place 1 patch onto the skin daily 30 patch 0    HYDROcodone-acetaminophen (NORCO) 5-325 MG per tablet Take 1 tablet by mouth every 6 hours as needed for Pain for up to 3 days. Intended supply: 3 days. Take lowest dose possible to manage pain 10 tablet 0    ondansetron (ZOFRAN) 4 MG tablet Take 1 tablet by mouth every 8 hours as needed for Nausea 10 tablet 0    methocarbamol (ROBAXIN) 750 MG tablet Take 1 tablet by mouth 4 times daily for 5 days 20 tablet 0    sodium chloride nebulizer 0.9 % NEBU 30 mL with albuterol (5 MG/ML) 0.5% NEBU Inhale 2.5 mg into the lungs once      fluticasone (ARNUITY ELLIPTA) 100 MCG/ACT AEPB Inhale into the lungs 1 time a day 1 each 3    albuterol sulfate  (90 Base) MCG/ACT inhaler Inhale 2 puffs into the lungs every 6 hours as needed for Wheezing 1 Inhaler 3    fluticasone (FLONASE) 50 MCG/ACT nasal spray 1 spray by Each Nostril route daily      naproxen (NAPROSYN) 500 MG tablet Take 500 mg by mouth 3 times daily (with meals)       No current facility-administered medications for this visit. No Known Allergies      Review of Systems   Constitutional:  Positive for activity change. Negative for appetite change, chills, diaphoresis, fatigue, fever and unexpected weight change. HENT:  Negative for congestion, ear pain, facial swelling, hearing loss and sneezing.     Eyes:  Negative for photophobia, pain and redness. Respiratory:  Negative for cough, shortness of breath, wheezing and stridor. Cardiovascular:  Negative for chest pain, palpitations and leg swelling. Gastrointestinal:  Negative for abdominal pain, nausea and vomiting. Endocrine: Negative for cold intolerance and heat intolerance. Musculoskeletal:  Positive for arthralgias, joint swelling and myalgias. Negative for back pain, gait problem, neck pain and neck stiffness. Skin:  Negative for color change, pallor, rash and wound. Neurological:  Negative for dizziness, facial asymmetry, weakness and numbness. Examination:  General Exam:  Vitals: BP (!) 140/90 (Site: Right Wrist, Position: Sitting)   Ht 5' 6\" (1.676 m)   Wt 180 lb 6.4 oz (81.8 kg)   BMI 29.12 kg/m²    Physical Exam  Constitutional:       General: She is in acute distress. Appearance: Normal appearance. She is normal weight. She is not ill-appearing. HENT:      Head: Normocephalic and atraumatic. Eyes:      General:         Right eye: No discharge. Left eye: No discharge. Cardiovascular:      Rate and Rhythm: Normal rate. Pulses: Normal pulses. Pulmonary:      Effort: Pulmonary effort is normal. No respiratory distress. Chest:      Chest wall: No tenderness. Musculoskeletal:         General: Swelling, tenderness and signs of injury present. No deformity. Right shoulder: Normal.      Left shoulder: Normal.      Right upper arm: Normal.      Left upper arm: Normal.      Right elbow: Normal.      Left elbow: Normal.      Right forearm: Normal.      Left forearm: No swelling, tenderness or bony tenderness. Right wrist: Normal.      Left wrist: Swelling, tenderness and bony tenderness present. No deformity, snuff box tenderness or crepitus. Decreased range of motion. Normal pulse. Right hand: Normal.      Left hand: Normal.      Cervical back: Normal range of motion.    Skin:     General: Skin is warm and dry. Capillary Refill: Capillary refill takes less than 2 seconds. Findings: No bruising. Neurological:      General: No focal deficit present. Mental Status: She is alert and oriented to person, place, and time. Psychiatric:         Mood and Affect: Mood normal.         Behavior: Behavior normal.      LEFT HAND EXAM:    OBSERVATION / INSPECTION:     Swelling: Minimal over dorsum of radius    Deformity: none    Discoloration: none     Scars: none     Atrophy: none      TENDERNESS / CREPITUS (T / C):      1st Dorsal compartment -/-    FCR -/-    FCU -/-    Ulnar Styloid -/-    Radial Styloid +/-    Pain with palpation over dorsum of radius at fracture site    Palm -/-    Metacarpals -/-    Thumb CMC -/-     Thumb MCP -/-    Lesser MCPs -/-    PIP -/-    DIP -/-      Range of motion: ('*' = with pain)       Left hand    Full extension of fingers, full flexion to the palm of all fingers        Right Elbow     AROM (PROM)     Extension 0 deg  (5 deg)     Flexion 145 deg (145 deg)        Pronation 90 deg  (90 deg)     Supination 80 deg  (80 deg)                Left Elbow     AROM (PROM)     Extension 0 deg  (5 deg)     Flexion 145 deg (145 deg)        Pronation 90 deg  (90 deg)     Supination 80 deg  (80 deg)        Right Wrist      Extension 80 deg (85 deg)     Flexion 80 deg (85 deg)        Ulnar Deviation  35 deg (40 deg)    Radial Deviation 35 deg (40 deg)             Left Wrist     AROM (PROM)     Extension 60 deg (65 deg) *    Flexion 60 deg (65 deg)   *    Ulnar Deviation  35 deg (40 deg)    Radial Deviation 35 deg (40 deg)         WRIST AND HAND EXAMINATION:    See above noted areas of tenderness.      TTP at 1st Dorsal Compartment neg    Snuff box tenderness neg    No median nerve signs on physical exam         STRENGTH: ('*' = with pain)     Elbow Flexion: 5/5    Elbow Extension: 5/5    Wrist flexion and extension as well as  intact but strength not tested fully due to known fracture    EPL (Extensor pollicis longus): 5/5    Pinch Mechanism: 5/5      EXTREMITY NEURO-VASCULAR EXAMINATION: Sensation grossly intact to light touch all dermatomal regions. DTR 2+ Biceps, Triceps, BR and Negative Britt's sign. Grossly intact motor function at Elbow, Wrist and Hand. Distal pulses radial and ulnar 2+, brisk cap refill, symmetric. Diagnostic testing:  X-ray images were reviewed by myself and discussed with the patient:  3 views of the left wrist in a skeletally mature patient performed in the emergency room demonstrate:  Comminuted impacted fracture distal radius. No dislocation. No other   fractures. Minimal loss of radial height, mild loss of radial inclination and fractures dorsally displaced with dorsal comminution      Office Procedures:  No orders of the defined types were placed in this encounter. Assessment and Plan    A: Left distal radius fracture, intra-articular    P:   I had a very long conversation with the patient and her  regarding her injury and treatment course. I went over her x-rays thoroughly and explained that with the intra-articular nature of the fracture as well as her loss of radial height and inclination as well as her dorsal displacement and dorsal comminution and her activity level, I do feel that she is a surgical candidate rather than someone I would typically place in a cast.  I again explained what I was looking at on the x-rays and how these correlated to being problematic and lead her to increased risk in the future with arthritis and further displacement if we decide to do more conservative treatment. However, I did explain that conservative treatment is always an option and that it should always be considered. Patient was very anxious during our conversation and concerned about pain levels. After going through both options several times and what each would entail, patient chose to undergo surgery.   However, upon my surgery schedule entering the room she changed her mind and decided that she wanted to have her wrist casted. I then returned to the room and went over both options again to make sure that she understood what she was choosing in the risks, alternatives, benefits to both surgical treatment as well as cast treatment. She voices her understanding. At this point we placed her in a short arm well molded cast without issue. She will follow-up in 1 week for x-rays in the cast.  If her fracture remains in good position and she has no displacement of the articular component including any articular step-off or diastases and she has no further worsening of her radial inclination, height or increased dorsal tilt, we will continue with casting and she will be brought back 2 weeks after for new x-rays again in the cast.  However, if any of these parameters worsen I will again discussed with her surgical intervention and she voiced understanding. She will continue to ice and elevate the extremity and take the Norco as provided by the emergency room. She did tolerate the cast placement without issue. I went over her cast restrictions as well as her upper extremity restrictions including no using of the arm including pulling, pushing, weightbearing. All questions were answered and patient voices her understanding.     Electronically signed by Bc Beebe DO on 9/21/2022 at 10:41 AM

## 2022-09-21 NOTE — PROGRESS NOTES
Patient is here for ED Follow up after a falling incident 9/20/22. ED DX Closed fracture of distal end of left radius, unspecified fracture morphology, initial encounter. Intermittent numbness and tingling. Pain rated 8/10. Described as aching, throbbing and generalized.      ED XRAY impression    Comminuted impacted fracture distal radius

## 2022-09-28 ENCOUNTER — OFFICE VISIT (OUTPATIENT)
Dept: ORTHOPEDIC SURGERY | Age: 52
End: 2022-09-28
Payer: COMMERCIAL

## 2022-09-28 VITALS
HEIGHT: 66 IN | RESPIRATION RATE: 16 BRPM | DIASTOLIC BLOOD PRESSURE: 78 MMHG | SYSTOLIC BLOOD PRESSURE: 132 MMHG | BODY MASS INDEX: 28.78 KG/M2 | HEART RATE: 84 BPM | WEIGHT: 179.1 LBS | OXYGEN SATURATION: 96 %

## 2022-09-28 DIAGNOSIS — S52.572D OTHER CLOSED INTRA-ARTICULAR FRACTURE OF DISTAL END OF LEFT RADIUS WITH ROUTINE HEALING, SUBSEQUENT ENCOUNTER: Primary | ICD-10-CM

## 2022-09-28 PROBLEM — S52.502D CLOSED FRACTURE OF LOWER END OF LEFT RADIUS WITH ROUTINE HEALING: Status: ACTIVE | Noted: 2022-09-28

## 2022-09-28 PROCEDURE — 99213 OFFICE O/P EST LOW 20 MIN: CPT | Performed by: STUDENT IN AN ORGANIZED HEALTH CARE EDUCATION/TRAINING PROGRAM

## 2022-09-28 ASSESSMENT — ENCOUNTER SYMPTOMS
STRIDOR: 0
ABDOMINAL PAIN: 0
NAUSEA: 0
COLOR CHANGE: 0
EYE REDNESS: 0
EYE PAIN: 0
PHOTOPHOBIA: 0
FACIAL SWELLING: 0
VOMITING: 0
BACK PAIN: 0
COUGH: 0
SHORTNESS OF BREATH: 0
WHEEZING: 0

## 2022-09-28 NOTE — PROGRESS NOTES
Patient comes in today for a 1 week follow up of a left distal radius fx. She was last seen in our office on 9/21/22. She rates her pain at 4/10. She denies any new falls or injuries since her last visit. X-rays were taken in the cast. Cast is clean and intact.

## 2022-09-28 NOTE — PROGRESS NOTES
9/28/2022   Chief Complaint   Patient presents with    Fracture     Left distal radius fx      Updated HPI: Patient is here for reevaluation of left distal radius fracture as well as cast check. Patient states she has tolerated cast well and has no new complaints. She states that her pain is much better in the cast than it was when I saw her previously 1 week prior. No new injuries or complaints. She has been following all restrictions. No changes to her health history. Previous HPI (9/21/2022):                             Suzan Berger is a 45 y.o. female right hand-dominant male referred by emergency room for evaluation and treatment of a left wrist injury. Patient states this occurred yesterday when she fell down the last 3-4 steps in her home landing on her outstretched left arm. She also hit her low back on the steps. She had immediate pain over the left wrist and was brought to the emergency room. The pain is currently rated moderate. The patient was originally seen at local emergency room where an x-ray was done, a fracture of the distal radius identified and she  was placed in a ulnar gutter splint. The patient was subsequently referred to Orthopedics for further management. The splint has remained in place and is clean and dry. She  denies prior injury to left wrist, denies associated injuries, prior injury to the upper extremity. Denies numbness, tingling, fever, chills. Related history: negative for prior surgery, additional trauma, arthritis or disorders. Is affecting ADLs. Pain is 10/10 at it's worst.    Outside reports reviewed: Emergency room note and imaging reviewed    Patient's medications, allergies, past medical, surgical, social and family histories were reviewed and updated as appropriate. Patient has had no other treatment thus far including CSI, PT, NSAIDs for pain relief. She has been receiving Norco for pain relief.     MA HPI: Patient is here for ED Follow up after a falling incident 9/20/22. ED DX Closed fracture of distal end of left radius, unspecified fracture morphology, initial encounter. Intermittent numbness and tingling. Pain rated 8/10. Described as aching, throbbing and generalized. Medical History  Patient's medications, allergies, past medical, surgical, social and family histories were reviewed and updated as appropriate. Past Medical History:   Diagnosis Date    Anxiety     Asthma      No past surgical history on file. Family History   Problem Relation Age of Onset    Cancer Mother      Social History     Socioeconomic History    Marital status:    Tobacco Use    Smoking status: Former    Smokeless tobacco: Never   Substance and Sexual Activity    Alcohol use: Yes     Comment: occ    Drug use: Never     Current Outpatient Medications   Medication Sig Dispense Refill    calcium-vitamin D (OSCAL) 250-125 MG-UNIT per tablet Take 2 tablets by mouth daily      fluticasone (ARNUITY ELLIPTA) 100 MCG/ACT AEPB Inhale into the lungs 1 time a day 1 each 3    albuterol sulfate  (90 Base) MCG/ACT inhaler Inhale 2 puffs into the lungs every 6 hours as needed for Wheezing 1 Inhaler 3    lidocaine 4 % external patch Place 1 patch onto the skin daily (Patient not taking: Reported on 9/28/2022) 30 patch 0    ondansetron (ZOFRAN) 4 MG tablet Take 1 tablet by mouth every 8 hours as needed for Nausea (Patient not taking: Reported on 9/28/2022) 10 tablet 0    sodium chloride nebulizer 0.9 % NEBU 30 mL with albuterol (5 MG/ML) 0.5% NEBU Inhale 2.5 mg into the lungs once (Patient not taking: Reported on 9/28/2022)      fluticasone (FLONASE) 50 MCG/ACT nasal spray 1 spray by Each Nostril route daily (Patient not taking: Reported on 9/28/2022)      naproxen (NAPROSYN) 500 MG tablet Take 500 mg by mouth 3 times daily (with meals) (Patient not taking: Reported on 9/28/2022)       No current facility-administered medications for this visit. Allergies   Allergen Reactions    Latex Itching and Rash         Review of Systems   Constitutional:  Positive for activity change. Negative for appetite change, chills, diaphoresis, fatigue, fever and unexpected weight change. HENT:  Negative for congestion, ear pain, facial swelling, hearing loss and sneezing. Eyes:  Negative for photophobia, pain and redness. Respiratory:  Negative for cough, shortness of breath, wheezing and stridor. Cardiovascular:  Negative for chest pain, palpitations and leg swelling. Gastrointestinal:  Negative for abdominal pain, nausea and vomiting. Endocrine: Negative for cold intolerance and heat intolerance. Musculoskeletal:  Positive for arthralgias, joint swelling and myalgias. Negative for back pain, gait problem, neck pain and neck stiffness. Skin:  Negative for color change, pallor, rash and wound. Neurological:  Negative for dizziness, facial asymmetry, weakness and numbness. Examination:  General Exam:  Vitals: /78 (Site: Right Lower Arm, Position: Sitting)   Pulse 84   Resp 16   Ht 5' 6\" (1.676 m)   Wt 179 lb 1.6 oz (81.2 kg)   SpO2 96%   BMI 28.91 kg/m²    Physical Exam  Constitutional:       General: She is not in acute distress. Appearance: Normal appearance. She is normal weight. She is not ill-appearing. HENT:      Head: Normocephalic and atraumatic. Eyes:      General:         Right eye: No discharge. Left eye: No discharge. Cardiovascular:      Rate and Rhythm: Normal rate. Pulses: Normal pulses. Pulmonary:      Effort: Pulmonary effort is normal. No respiratory distress. Chest:      Chest wall: No tenderness. Musculoskeletal:         General: Tenderness and signs of injury present. No swelling or deformity.       Right shoulder: Normal.      Left shoulder: Normal.      Right upper arm: Normal.      Left upper arm: Normal.      Right elbow: Normal.      Left elbow: Normal.      Right forearm: Normal.      Left forearm: No swelling, tenderness or bony tenderness. Right wrist: Normal.      Left wrist: Tenderness and bony tenderness present. No swelling, deformity, snuff box tenderness or crepitus. Decreased range of motion. Normal pulse. Right hand: Normal.      Left hand: Normal.      Cervical back: Normal range of motion. Skin:     General: Skin is warm and dry. Capillary Refill: Capillary refill takes less than 2 seconds. Findings: No bruising. Neurological:      General: No focal deficit present. Mental Status: She is alert and oriented to person, place, and time. Psychiatric:         Mood and Affect: Mood normal.         Behavior: Behavior normal.      LEFT HAND EXAM:    Cast in place without any areas of breakdown or skin irritation. Remains well-positioned.     OBSERVATION / INSPECTION:     Swelling: Minimal and improved over dorsum of radius    Deformity: none    Discoloration: none     Scars: none     Atrophy: none      TENDERNESS / CREPITUS (T / C):      1st Dorsal compartment -/-    FCR -/-    FCU -/-    Ulnar Styloid -/-    Radial Styloid +/-    Pain with palpation over dorsum of radius at fracture site    Palm -/-    Metacarpals -/-    Thumb CMC -/-     Thumb MCP -/-    Lesser MCPs -/-    PIP -/-    DIP -/-      Range of motion: ('*' = with pain)       Left hand    Full extension of fingers, full flexion to the palm of all fingers        Right Elbow     AROM (PROM)     Extension 0 deg  (5 deg)     Flexion 145 deg (145 deg)        Pronation 90 deg  (90 deg)     Supination 80 deg  (80 deg)                Left Elbow     AROM (PROM)     Extension 0 deg  (5 deg)     Flexion 145 deg (145 deg)        Pronation 90 deg  (90 deg)     Supination 80 deg  (80 deg)        Right Wrist      Extension 80 deg (85 deg)     Flexion 80 deg (85 deg)        Ulnar Deviation  35 deg (40 deg)    Radial Deviation 35 deg (40 deg)             Left Wrist AROM (PROM)     Extension 60 deg (65 deg) *    Flexion 60 deg (65 deg)   *    Ulnar Deviation  35 deg (40 deg)    Radial Deviation 35 deg (40 deg)         WRIST AND HAND EXAMINATION:    See above noted areas of tenderness. TTP at 1st Dorsal Compartment neg    Snuff box tenderness neg    No median nerve signs on physical exam         STRENGTH: ('*' = with pain)     Elbow Flexion: 5/5    Elbow Extension: 5/5    Wrist flexion and extension as well as  intact but strength not tested fully due to known fracture    EPL (Extensor pollicis longus): 5/5    Pinch Mechanism: 5/5      EXTREMITY NEURO-VASCULAR EXAMINATION: Sensation grossly intact to light touch all dermatomal regions. DTR 2+ Biceps, Triceps, BR and Negative Britt's sign. Grossly intact motor function at Elbow, Wrist and Hand. Distal pulses radial and ulnar 2+, brisk cap refill, symmetric. Diagnostic testing:  X-ray images were reviewed by myself and discussed with the patient:  3 views of the left wrist in a skeletally mature patient with overlying cast demonstrates previously seen intra-articular distal radius fracture. Loss of radial height and inclination seen. Articular step-off at the lunate facet is less than 1 mm and no displacement compared to previous imaging. Patient remains dorsally angulated, unchanged from previous imaging. No new osseous or soft tissue abnormality seen. Previous imaging:  3 views of the left wrist in a skeletally mature patient performed in the emergency room demonstrate:  Comminuted impacted fracture distal radius. No dislocation. No other   fractures. Minimal loss of radial height, mild loss of radial inclination and fractures dorsally displaced with dorsal comminution      Office Procedures:  No orders of the defined types were placed in this encounter.       Assessment and Plan    A: Left distal radius fracture, intra-articular    P:   I again had a thorough conversation with the patient regarding her new imaging as well as her treatment course. I explained that at this time although she does have a small amount of articular step-off and has loss of radial height inclination as well as dorsal tilt she does remain in a acceptable position her her expectations and her desire to not go through with surgery. I again explained that the current position of the fracture as well as intra-articular nature do not make it ideal for cast treatment but she is very adamant about not wanting surgery. We applied some moleskin to the cast around the thumb to add protection although the skin is not irritated at this time. She will follow-up in 2 weeks for x-rays in cast and we will make a more definitive decision on whether surgery is warranted but we will likely continue casting at that time as she does not show any increased displacement and is tolerating the cast well. If doing well that time, we will continue the cast for additional 3 weeks and then wean her out into an Exos. We will continue to have her ice and elevate the extremity and use over-the-counter pain medication. She will continue her restrictions of no lifting, pulling, pushing with the left upper extremity. All questions were answered and patient and her  voiced understanding.     Electronically signed by Uche Smith DO on 9/28/2022 at 11:33 AM

## 2022-10-12 ENCOUNTER — OFFICE VISIT (OUTPATIENT)
Dept: ORTHOPEDIC SURGERY | Age: 52
End: 2022-10-12
Payer: COMMERCIAL

## 2022-10-12 VITALS — OXYGEN SATURATION: 97 % | SYSTOLIC BLOOD PRESSURE: 124 MMHG | DIASTOLIC BLOOD PRESSURE: 87 MMHG | HEART RATE: 78 BPM

## 2022-10-12 DIAGNOSIS — S52.572D OTHER CLOSED INTRA-ARTICULAR FRACTURE OF DISTAL END OF LEFT RADIUS WITH ROUTINE HEALING, SUBSEQUENT ENCOUNTER: Primary | ICD-10-CM

## 2022-10-12 PROCEDURE — 99213 OFFICE O/P EST LOW 20 MIN: CPT | Performed by: STUDENT IN AN ORGANIZED HEALTH CARE EDUCATION/TRAINING PROGRAM

## 2022-10-12 ASSESSMENT — ENCOUNTER SYMPTOMS
VOMITING: 0
FACIAL SWELLING: 0
ABDOMINAL PAIN: 0
NAUSEA: 0
STRIDOR: 0
EYE REDNESS: 0
EYE PAIN: 0
WHEEZING: 0
SHORTNESS OF BREATH: 0
COLOR CHANGE: 0
PHOTOPHOBIA: 0
BACK PAIN: 0
COUGH: 0

## 2022-10-12 NOTE — PROGRESS NOTES
10/12/2022   Chief Complaint   Patient presents with    Follow-up     Patient is here for 3 wk follow up on left distal radius fracture. In cast      Updated HPI: Patient is here for reevaluation of her left distal radius fracture. Patient states she is doing well although she does have some finger swelling. She has been tolerating the cast without issue. She has no new injuries or complaints. No changes in her health history. She has been good about following her restrictions. Previous HPI (9/28/2022): Patient is here for reevaluation of left distal radius fracture as well as cast check. Patient states she has tolerated cast well and has no new complaints. She states that her pain is much better in the cast than it was when I saw her previously 1 week prior. No new injuries or complaints. She has been following all restrictions. No changes to her health history. Previous HPI (9/21/2022):                             Charlotte Rodriguez is a 45 y.o. female right hand-dominant male referred by emergency room for evaluation and treatment of a left wrist injury. Patient states this occurred yesterday when she fell down the last 3-4 steps in her home landing on her outstretched left arm. She also hit her low back on the steps. She had immediate pain over the left wrist and was brought to the emergency room. The pain is currently rated moderate. The patient was originally seen at local emergency room where an x-ray was done, a fracture of the distal radius identified and she  was placed in a ulnar gutter splint. The patient was subsequently referred to Orthopedics for further management. The splint has remained in place and is clean and dry. She  denies prior injury to left wrist, denies associated injuries, prior injury to the upper extremity. Denies numbness, tingling, fever, chills. Related history: negative for prior surgery, additional trauma, arthritis or disorders.      Is affecting ADLs.  Pain is 10/10 at it's worst.    Outside reports reviewed: Emergency room note and imaging reviewed    Patient's medications, allergies, past medical, surgical, social and family histories were reviewed and updated as appropriate. Patient has had no other treatment thus far including CSI, PT, NSAIDs for pain relief. She has been receiving Norco for pain relief. MA HPI: Patient is here for ED Follow up after a falling incident 9/20/22. ED DX Closed fracture of distal end of left radius, unspecified fracture morphology, initial encounter. Intermittent numbness and tingling. Pain rated 8/10. Described as aching, throbbing and generalized. Medical History  Patient's medications, allergies, past medical, surgical, social and family histories were reviewed and updated as appropriate. Past Medical History:   Diagnosis Date    Anxiety     Asthma      No past surgical history on file.   Family History   Problem Relation Age of Onset    Cancer Mother      Social History     Socioeconomic History    Marital status:    Tobacco Use    Smoking status: Former    Smokeless tobacco: Never   Substance and Sexual Activity    Alcohol use: Yes     Comment: occ    Drug use: Never     Current Outpatient Medications   Medication Sig Dispense Refill    calcium-vitamin D (OSCAL) 250-125 MG-UNIT per tablet Take 2 tablets by mouth daily      lidocaine 4 % external patch Place 1 patch onto the skin daily (Patient not taking: Reported on 9/28/2022) 30 patch 0    ondansetron (ZOFRAN) 4 MG tablet Take 1 tablet by mouth every 8 hours as needed for Nausea (Patient not taking: Reported on 9/28/2022) 10 tablet 0    sodium chloride nebulizer 0.9 % NEBU 30 mL with albuterol (5 MG/ML) 0.5% NEBU Inhale 2.5 mg into the lungs once (Patient not taking: Reported on 9/28/2022)      fluticasone (ARNUITY ELLIPTA) 100 MCG/ACT AEPB Inhale into the lungs 1 time a day 1 each 3    albuterol sulfate  (90 Base) MCG/ACT inhaler Inhale 2 puffs into the lungs every 6 hours as needed for Wheezing 1 Inhaler 3    fluticasone (FLONASE) 50 MCG/ACT nasal spray 1 spray by Each Nostril route daily (Patient not taking: Reported on 9/28/2022)      naproxen (NAPROSYN) 500 MG tablet Take 500 mg by mouth 3 times daily (with meals) (Patient not taking: Reported on 9/28/2022)       No current facility-administered medications for this visit. Allergies   Allergen Reactions    Latex Itching and Rash         Review of Systems   Constitutional:  Positive for activity change. Negative for appetite change, chills, diaphoresis, fatigue, fever and unexpected weight change. HENT:  Negative for congestion, ear pain, facial swelling, hearing loss and sneezing. Eyes:  Negative for photophobia, pain and redness. Respiratory:  Negative for cough, shortness of breath, wheezing and stridor. Cardiovascular:  Negative for chest pain, palpitations and leg swelling. Gastrointestinal:  Negative for abdominal pain, nausea and vomiting. Endocrine: Negative for cold intolerance and heat intolerance. Musculoskeletal:  Positive for arthralgias, joint swelling and myalgias. Negative for back pain, gait problem, neck pain and neck stiffness. Skin:  Negative for color change, pallor, rash and wound. Neurological:  Negative for dizziness, facial asymmetry, weakness and numbness. Examination:  General Exam:  Vitals: /87 (Site: Right Wrist, Position: Sitting)   Pulse 78   SpO2 97%    Physical Exam  Constitutional:       General: She is not in acute distress. Appearance: Normal appearance. She is normal weight. She is not ill-appearing. HENT:      Head: Normocephalic and atraumatic. Eyes:      General:         Right eye: No discharge. Left eye: No discharge. Cardiovascular:      Rate and Rhythm: Normal rate. Pulses: Normal pulses.    Pulmonary:      Effort: Pulmonary effort is normal. No respiratory distress. Chest:      Chest wall: No tenderness. Musculoskeletal:         General: Swelling, tenderness and signs of injury present. No deformity. Right shoulder: Normal.      Left shoulder: Normal.      Right upper arm: Normal.      Left upper arm: Normal.      Right elbow: Normal.      Left elbow: Normal.      Right forearm: Normal.      Left forearm: No swelling, tenderness or bony tenderness. Right wrist: Normal.      Left wrist: Tenderness and bony tenderness present. No swelling, deformity, snuff box tenderness or crepitus. Decreased range of motion. Normal pulse. Right hand: Normal.      Left hand: Normal.      Cervical back: Normal range of motion. Skin:     General: Skin is warm and dry. Capillary Refill: Capillary refill takes less than 2 seconds. Findings: No bruising. Neurological:      General: No focal deficit present. Mental Status: She is alert and oriented to person, place, and time. Psychiatric:         Mood and Affect: Mood normal.         Behavior: Behavior normal.      LEFT HAND EXAM:    Cast in place without any areas of breakdown or skin irritation. Remains well-positioned.     OBSERVATION / INSPECTION:     Swelling: Mild at exposed area of fingers    Deformity: none    Discoloration: none     Scars: none     Atrophy: none      TENDERNESS / CREPITUS (T / C):      1st Dorsal compartment -/-    FCR -/-    FCU -/-    Ulnar Styloid -/-    Radial Styloid +/-    Pain with palpation over dorsum of radius at fracture site    Palm -/-    Metacarpals -/-    Thumb CMC -/-     Thumb MCP -/-    Lesser MCPs -/-    PIP -/-    DIP -/-      Range of motion: ('*' = with pain)       Left hand    Unable to test at this time due to cast in place        Right Elbow     AROM (PROM)     Extension 0 deg  (5 deg)     Flexion 145 deg (145 deg)        Pronation 90 deg  (90 deg)     Supination 80 deg  (80 deg)                Left Elbow     AROM (PROM)     Extension 0 deg (5 deg)     Flexion 145 deg (145 deg)        Pronation 90 deg  (90 deg)     Supination 80 deg  (80 deg)        Right Wrist      Extension 80 deg (85 deg)     Flexion 80 deg (85 deg)        Ulnar Deviation  35 deg (40 deg)    Radial Deviation 35 deg (40 deg)             Left Wrist     AROM (PROM)     Extension 60 deg (65 deg) *    Flexion 60 deg (65 deg)   *    Ulnar Deviation  35 deg (40 deg)    Radial Deviation 35 deg (40 deg)         WRIST AND HAND EXAMINATION:    See above noted areas of tenderness. TTP at 1st Dorsal Compartment neg    Snuff box tenderness neg    No median nerve signs on physical exam         STRENGTH: ('*' = with pain)     Elbow Flexion: 5/5    Elbow Extension: 5/5    Wrist flexion and extension as well as  intact but strength not tested fully due to known fracture    EPL (Extensor pollicis longus): 5/5    Pinch Mechanism: 5/5      EXTREMITY NEURO-VASCULAR EXAMINATION: Sensation grossly intact to light touch all dermatomal regions. DTR 2+ Biceps, Triceps, BR and Negative Britt's sign. Grossly intact motor function at Elbow, Wrist and Hand. Distal pulses radial and ulnar 2+, brisk cap refill, symmetric. Diagnostic testing:  X-ray images were reviewed by myself and discussed with the patient:  3 views of the left wrist in a skeletally mature patient demonstrates previously seen distal radius fracture with no change in displacement compared to previous imaging. Continued articular step-off with no displacement compared to previous imaging. Continues to be dorsally angulated with loss of radial inclination and minimal loss of radial height. No new osseous or soft tissue abnormality seen. Previous imaging:  3 views of the left wrist in a skeletally mature patient with overlying cast demonstrates previously seen intra-articular distal radius fracture. Loss of radial height and inclination seen.   Articular step-off at the lunate facet is less than 1 mm and no displacement compared to previous imaging. Patient remains dorsally angulated, unchanged from previous imaging. No new osseous or soft tissue abnormality seen. Previous imaging:  3 views of the left wrist in a skeletally mature patient performed in the emergency room demonstrate:  Comminuted impacted fracture distal radius. No dislocation. No other   fractures. Minimal loss of radial height, mild loss of radial inclination and fractures dorsally displaced with dorsal comminution      Office Procedures:  No orders of the defined types were placed in this encounter. Assessment and Plan    A: Left distal radius fracture, intra-articular    P:   I again had a thorough conversation with the patient and her  regarding her new imaging as well as her treatment course. I explained that there is no displacement of the fracture and although the alignment is not ideal it is adequate and because there is been no displacement we can continue conservative treatment. At this time we will have her in the cast for an additional 4 weeks. She will follow-up in 4 weeks for cast removal and x-rays out of cast.  If doing well at that time we will transition her to [place] [id number]. All questions were answered and patient and her  voiced understanding. She will continue her restrictions. She will continue ice and elevation to help with swelling. She will continue over-the-counter pain medication for pain control.     Electronically signed by Jolie Ziegler DO on 10/12/2022 at 3:58 PM

## 2022-10-12 NOTE — PATIENT INSTRUCTIONS
Ice and elevate as needed. Tylenol or Motrin for pain. Follow up in 4 weeks. Out of cast in next visit.

## 2022-10-12 NOTE — PROGRESS NOTES
Patient is asking if her fingers look normal on xray. Patient denies new injury since casting. Describes pain as a sprain, rated 6/10.  Patient states she only feels the pain when \"shifting\" inside of her cast.

## 2022-11-09 ENCOUNTER — OFFICE VISIT (OUTPATIENT)
Dept: ORTHOPEDIC SURGERY | Age: 52
End: 2022-11-09
Payer: COMMERCIAL

## 2022-11-09 VITALS — HEART RATE: 101 BPM | OXYGEN SATURATION: 99 % | DIASTOLIC BLOOD PRESSURE: 86 MMHG | SYSTOLIC BLOOD PRESSURE: 139 MMHG

## 2022-11-09 DIAGNOSIS — S52.572D OTHER CLOSED INTRA-ARTICULAR FRACTURE OF DISTAL END OF LEFT RADIUS WITH ROUTINE HEALING, SUBSEQUENT ENCOUNTER: Primary | ICD-10-CM

## 2022-11-09 PROCEDURE — 99213 OFFICE O/P EST LOW 20 MIN: CPT | Performed by: STUDENT IN AN ORGANIZED HEALTH CARE EDUCATION/TRAINING PROGRAM

## 2022-11-09 ASSESSMENT — ENCOUNTER SYMPTOMS
EYE PAIN: 0
NAUSEA: 0
COUGH: 0
FACIAL SWELLING: 0
ABDOMINAL PAIN: 0
SHORTNESS OF BREATH: 0
PHOTOPHOBIA: 0
COLOR CHANGE: 0
EYE REDNESS: 0
WHEEZING: 0
BACK PAIN: 0
VOMITING: 0
STRIDOR: 0

## 2022-11-09 NOTE — PROGRESS NOTES
Patient is here for 4 week follow up on distal end of left radius fracture. Removed cast today for new xrays. Patient states she cannot move her hand after removal of cast. Noticeable skin contusion the the \"ball\" of the ulna. Patient reporting pain after removal of cast. Pain rated 8/10 described as aching.

## 2022-11-09 NOTE — PATIENT INSTRUCTIONS
Ice and elevate as needed. Tylenol or Motrin for pain. Wrist brace given today. No lifting more than 10 lbs. Occupational Therapy Ordered today   Follow up in 2 months. [facility]ational [facility]  [address], [place] [address] [phone] (L)  [phone] (f)

## 2022-11-09 NOTE — PROGRESS NOTES
11/9/2022   Chief Complaint   Patient presents with    Follow-up     4 week follow up on distal end of left radius fracture         Updated HPI: Patient is here for reevaluation of her left distal radius fracture as well as x-rays out of cast.  Patient has no new complaints in regards to left wrist.  She states that she is doing well and her pain continues to improve. No new injuries since last being seen. No changes to her health history. Previous HPI (10/12/2022): Patient is here for reevaluation of her left distal radius fracture. Patient states she is doing well although she does have some finger swelling. She has been tolerating the cast without issue. She has no new injuries or complaints. No changes in her health history. She has been good about following her restrictions. Previous HPI (9/28/2022): Patient is here for reevaluation of left distal radius fracture as well as cast check. Patient states she has tolerated cast well and has no new complaints. She states that her pain is much better in the cast than it was when I saw her previously 1 week prior. No new injuries or complaints. She has been following all restrictions. No changes to her health history. Previous HPI (9/21/2022):                             Laura Macias is a 45 y.o. female right hand-dominant male referred by emergency room for evaluation and treatment of a left wrist injury. Patient states this occurred yesterday when she fell down the last 3-4 steps in her home landing on her outstretched left arm. She also hit her low back on the steps. She had immediate pain over the left wrist and was brought to the emergency room. The pain is currently rated moderate. The patient was originally seen at local emergency room where an x-ray was done, a fracture of the distal radius identified and she  was placed in a ulnar gutter splint. The patient was subsequently referred to Orthopedics for further management. The splint has remained in place and is clean and dry. She  denies prior injury to left wrist, denies associated injuries, prior injury to the upper extremity. Denies numbness, tingling, fever, chills. Related history: negative for prior surgery, additional trauma, arthritis or disorders. Is affecting ADLs. Pain is 10/10 at it's worst.    Outside reports reviewed: Emergency room note and imaging reviewed    Patient's medications, allergies, past medical, surgical, social and family histories were reviewed and updated as appropriate. Patient has had no other treatment thus far including CSI, PT, NSAIDs for pain relief. She has been receiving Norco for pain relief. MA HPI: Patient is here for ED Follow up after a falling incident 9/20/22. ED DX Closed fracture of distal end of left radius, unspecified fracture morphology, initial encounter. Intermittent numbness and tingling. Pain rated 8/10. Described as aching, throbbing and generalized. Medical History  Patient's medications, allergies, past medical, surgical, social and family histories were reviewed and updated as appropriate. Past Medical History:   Diagnosis Date    Anxiety     Asthma      No past surgical history on file.   Family History   Problem Relation Age of Onset    Cancer Mother      Social History     Socioeconomic History    Marital status:    Tobacco Use    Smoking status: Former    Smokeless tobacco: Never   Substance and Sexual Activity    Alcohol use: Yes     Comment: occ    Drug use: Never     Current Outpatient Medications   Medication Sig Dispense Refill    calcium-vitamin D (OSCAL) 250-125 MG-UNIT per tablet Take 2 tablets by mouth daily      ondansetron (ZOFRAN) 4 MG tablet Take 1 tablet by mouth every 8 hours as needed for Nausea (Patient not taking: Reported on 9/28/2022) 10 tablet 0    sodium chloride nebulizer 0.9 % NEBU 30 mL with albuterol (5 MG/ML) 0.5% NEBU Inhale 2.5 mg into the lungs once (Patient not taking: Reported on 9/28/2022)      fluticasone (ARNUITY ELLIPTA) 100 MCG/ACT AEPB Inhale into the lungs 1 time a day 1 each 3    albuterol sulfate  (90 Base) MCG/ACT inhaler Inhale 2 puffs into the lungs every 6 hours as needed for Wheezing 1 Inhaler 3    fluticasone (FLONASE) 50 MCG/ACT nasal spray 1 spray by Each Nostril route daily (Patient not taking: Reported on 9/28/2022)      naproxen (NAPROSYN) 500 MG tablet Take 500 mg by mouth 3 times daily (with meals) (Patient not taking: Reported on 9/28/2022)       No current facility-administered medications for this visit. Allergies   Allergen Reactions    Latex Itching and Rash         Review of Systems   Constitutional:  Positive for activity change. Negative for appetite change, chills, diaphoresis, fatigue, fever and unexpected weight change. HENT:  Negative for congestion, ear pain, facial swelling, hearing loss and sneezing. Eyes:  Negative for photophobia, pain and redness. Respiratory:  Negative for cough, shortness of breath, wheezing and stridor. Cardiovascular:  Negative for chest pain, palpitations and leg swelling. Gastrointestinal:  Negative for abdominal pain, nausea and vomiting. Endocrine: Negative for cold intolerance and heat intolerance. Musculoskeletal:  Positive for arthralgias, joint swelling and myalgias. Negative for back pain, gait problem, neck pain and neck stiffness. Skin:  Negative for color change, pallor, rash and wound. Neurological:  Negative for dizziness, facial asymmetry, weakness and numbness. Examination:  General Exam:  Vitals: /86 (Site: Right Wrist, Position: Sitting)   Pulse (!) 101   SpO2 99%    Physical Exam  Constitutional:       General: She is not in acute distress. Appearance: Normal appearance. She is normal weight. She is not ill-appearing. HENT:      Head: Normocephalic and atraumatic.    Eyes:      General: Right eye: No discharge. Left eye: No discharge. Cardiovascular:      Rate and Rhythm: Normal rate. Pulses: Normal pulses. Pulmonary:      Effort: Pulmonary effort is normal. No respiratory distress. Chest:      Chest wall: No tenderness. Musculoskeletal:         General: Swelling, tenderness and signs of injury present. No deformity. Right shoulder: Normal.      Left shoulder: Normal.      Right upper arm: Normal.      Left upper arm: Normal.      Right elbow: Normal.      Left elbow: Normal.      Right forearm: Normal.      Left forearm: No swelling, tenderness or bony tenderness. Right wrist: Normal.      Left wrist: Tenderness and bony tenderness present. No swelling, deformity, snuff box tenderness or crepitus. Decreased range of motion. Normal pulse. Right hand: Normal.      Left hand: Normal.      Cervical back: Normal range of motion. Skin:     General: Skin is warm and dry. Capillary Refill: Capillary refill takes less than 2 seconds. Findings: No bruising. Neurological:      General: No focal deficit present. Mental Status: She is alert and oriented to person, place, and time. Psychiatric:         Mood and Affect: Mood normal.         Behavior: Behavior normal.      LEFT HAND EXAM:    Cast in place without any areas of breakdown or skin irritation.   Removed without issue    OBSERVATION / INSPECTION:     Swelling: Minimal over dorsum of radius at fracture site    Deformity: none    Discoloration: none     Scars: none     Atrophy: none      TENDERNESS / CREPITUS (T / C):      1st Dorsal compartment -/-    FCR -/-    FCU -/-    Ulnar Styloid -/-    Radial Styloid +/-    Pain with palpation over dorsum of radius at fracture site, somewhat guarded at this area    Palm -/-    Metacarpals -/-    Thumb CMC -/-     Thumb MCP -/-    Lesser MCPs -/-    PIP -/-    DIP -/-      Range of motion: ('*' = with pain)       Left hand    Patient unable to fully make fist has near full extension of all fingers        Right Elbow     AROM (PROM)     Extension 0 deg  (5 deg)     Flexion 145 deg (145 deg)        Pronation 90 deg  (90 deg)     Supination 80 deg  (80 deg)                Left Elbow     AROM (PROM)     Extension 0 deg  (5 deg)     Flexion 145 deg (145 deg)        Pronation 90 deg  (90 deg)     Supination 80 deg  (80 deg)          Right Wrist    Extension 50 deg (55 deg) *    Flexion 50 deg (55 deg) *    Ulnar Deviation  35 deg (40 deg)    Radial Deviation 35 deg (40 deg)             Left Wrist     AROM (PROM)     Extension 60 deg (65 deg) *    Flexion 60 deg (65 deg)   *    Ulnar Deviation  35 deg (40 deg)    Radial Deviation 35 deg (40 deg)         WRIST AND HAND EXAMINATION:    See above noted areas of tenderness. TTP at 1st Dorsal Compartment neg    Snuff box tenderness neg    No median nerve signs on physical exam         STRENGTH: ('*' = with pain)     Elbow Flexion: 5/5    Elbow Extension: 5/5    Wrist flexion and extension intact but mildly painful. Patient does have some weakness with  due to pain as well    EPL (Extensor pollicis longus): 5/5    Pinch Mechanism: 5/5      EXTREMITY NEURO-VASCULAR EXAMINATION: Sensation grossly intact to light touch all dermatomal regions. DTR 2+ Biceps, Triceps, BR and Negative Britt's sign. Grossly intact motor function at Elbow, Wrist and Hand. Distal pulses radial and ulnar 2+, brisk cap refill, symmetric. Diagnostic testing:  X-ray images were reviewed by myself and discussed with the patient:  3 views of the left wrist in a skeletally mature patient demonstrates previously seen distal radius fracture with no change in displacement compared to previous imaging. Patient has minimal step-off at lunate facet. Minimal loss of radial height and inclination. Minimal dorsal tilt that is unchanged from previous imaging. No new osseous or soft tissue abnormalities.   Bony turnover seen at fracture site with mild callus formation. Previous imaging:  3 views of the left wrist in a skeletally mature patient demonstrates previously seen distal radius fracture with no change in displacement compared to previous imaging. Continued articular step-off with no displacement compared to previous imaging. Continues to be dorsally angulated with loss of radial inclination and minimal loss of radial height. No new osseous or soft tissue abnormality seen. 3 views of the left wrist in a skeletally mature patient with overlying cast demonstrates previously seen intra-articular distal radius fracture. Loss of radial height and inclination seen. Articular step-off at the lunate facet is less than 1 mm and no displacement compared to previous imaging. Patient remains dorsally angulated, unchanged from previous imaging. No new osseous or soft tissue abnormality seen. Previous imaging:  3 views of the left wrist in a skeletally mature patient performed in the emergency room demonstrate:  Comminuted impacted fracture distal radius. No dislocation. No other   fractures. Minimal loss of radial height, mild loss of radial inclination and fractures dorsally displaced with dorsal comminution      Office Procedures:  Orders Placed This Encounter   Procedures    [address]     Referral Priority:   Routine     Referral Type:   Eval and Treat     Referral Reason:   Specialty Services Required     Requested Specialty:   Occupational Therapy     Number of Visits Requested:   1     Left cast removed without issue. Assessment and Plan    A: Left distal radius fracture, intra-articular    P:     I had a very long and thorough conversation with the patient and her  regarding her left distal radius fracture including her new imaging and current physical exam findings. I explained that she has been healing this well and there are no concerning findings on exam or imaging today.   At this time we will transition her to an over-the-counter brace that she will wear whenever doing activities otherwise can be out of it for simple ADLs such as bathing, sleeping, eating, etc.  She is to lift no more than 10 pounds. I did provide her with a referral to occupational therapy due to her stiffness. She will follow-up in 2 months at that time we will reevaluate her range of motion and physical exam findings and if doing well we will transition her out of the brace. Again, patient is nonweightbearing of the left upper extremity and has a 10 pound weight lifting restriction but she is range of motion as tolerated. She can continue using ice and over-the-counter pain medications for pain control. All questions were answered and patient voices understanding.     Electronically signed by Immanuel Miller DO on 11/9/2022 at 3:22 PM

## 2022-11-16 ENCOUNTER — HOSPITAL ENCOUNTER (OUTPATIENT)
Dept: OCCUPATIONAL THERAPY | Age: 52
Setting detail: THERAPIES SERIES
Discharge: HOME | End: 2022-11-16
Payer: COMMERCIAL

## 2022-11-16 PROCEDURE — 97022 WHIRLPOOL THERAPY: CPT

## 2022-11-16 PROCEDURE — 97140 MANUAL THERAPY 1/> REGIONS: CPT

## 2022-11-16 PROCEDURE — 97110 THERAPEUTIC EXERCISES: CPT

## 2022-11-16 PROCEDURE — 97166 OT EVAL MOD COMPLEX 45 MIN: CPT

## 2022-11-16 NOTE — PLAN OF CARE
[x][facility] [address]      TON[address] LOPEZ [address]       [facility], [address], [address] [facility],Third [address]       [address] [phone] [phone] GW:[phone]  ________________________________________________________________________    Physician:    Dr Clark Martin From: [facility]  Patient: Vin Frias      : 1970  Diagnosis:   L wrist intraarticular fracture of distal radius  Physician ICD 10 Code: S52.572D   Treatment Diagnosis:  Wrist and hand pain and stiffness  ICD10 tx code: M79.642  M25.642 j M25.532  Date: 2022     MRN: [id number]     Occupational Therapy Certification/Re-Certification Form  Dear Dr. Ran Anderson  The following patient has been evaluated for occupational therapy services and for therapy to continue, insurance requires physician review of the treatment plan initially and every 90 days. Please review the attached evaluation and/or summary of the patient's plan of care, and verify that you agree therapy should continue by signing the attached document and sending it back to our office.     Plan of Care/Treatment to date:  [x] Therapeutic Exercise   [x] Modalities:  [x] Therapeutic Activity    [] Ultrasound [] Elec Stimulation   [] Total Motion Release    [x] Fluido [] Kinesiotaping  [] Neuromuscular Re-education   [] Ionto [] Coldpack/hotpack   [x] Instruction in HEP    Other:  [] Manual Therapy     [x] Edema control  [] Aquatic Therapy     []       Frequency/Duration:  # Days per week: [] 1 day # Weeks: [] 1 week [] 5 weeks     [x] 2 days   [] 2 weeks [x] 6 weeks     [] 3 days   [] 3 weeks [] 7 weeks     [] 4 days   [] 4 weeks [] 8 weeks         [] 9 weeks [] 10 weeks         [] 11 weeks [] 12 weeks    Rehab Potential/Progress: [] excellent [x] good [x] fair  [] poor       Goals:   Pt will decrease pain in L wrist and hand -3/10 with use to increase functional activity tolerance  Pt will increase AROM to 40/40 wrist and 2\" from palm fingers with composite fist to increase functional use of hand    Pt will follow thru and be independent with HEP   Edema control     LTG\"  Pt will decrease pain 0-1/10 L wrist and hand to increase functional activity tolerance   Pt will increase AROM to [facility] fist to increase functional use of L hand for daily activities   Pt will decrease quick dash below 30 for performance improvement    Electronically signed by:  Tarri Sandhoff, OT,OTR/L CHT, 11/16/2022, 11:27 AM    If you have any questions or concerns, please don't hesitate to call.   Thank you for your referral.      Physician Signature:__________________   Date:_____________ Time: _______  By signing above, therapists plan is approved by physician

## 2022-11-16 NOTE — FLOWSHEET NOTE
Occupational Therapy Out Patient Daily Treatment Note     [x][place] [address]      [address] [facility]     [address] [facility] [address]  [facility] [address]       [facility], [address], [address]       [facility] [address]     [phone]  [phone] [phone]  ______________________________________________________________________  Date:  2022  Patient Name:  Rosalba Sutton    :  1970  Restrictions/Precautions:  General  Diagnosis:   L DRF  Treatment Diagnosis:  hand and wrist pain and stiffness  Insurance/Certification information:  [employer]  Referring Physician:   Dr Jamil Herrera of care signed (Y/N):    Visit# / total visits:      Pain level: 3/10 At rest       8/10 with exercise    Subjective:   Prior Level of Function:  Full functional use before injury  Patient Goals: Return to PLOF    Treatment Flowsheet   Right Left     See eval                                                                                                                                     Interventions/Modalities used:  [x] Therapeutic Exercise   [x] Modalities:  [x] Therapeutic Activity    [] Ultrasound [] Elec Stimulation   [] Total Motion Release    [x] Fluido [] Kinesiotaping  [] Neuromuscular Re-education   [] Ionto [] Coldpack/hotpack   [x] Instruction in HEP    Other:edema control    Objective Findings:See eval    Communication with other providers: POC to physician    Education provided to patient: Issued and instructed in HEP    Adverse Reactions to treatment:none noted    Time in:  1015  Time out:  1115  Timed treatment minutes:  25  Total treatment time:  60      If BWC Please Indicate Time In/Out  CPT Code Time In Time Out Total Min                                                                    Treatment/Activity Tolerance:     [x]  Patient tolerated treatment well []  Patient limited by fatique    []  Patient limited by pain []  Patient limited by other medical complications   []  Other:     Goals:   Pt will decrease pain in L wrist and hand 2-3/10 with use to increase functional activity tolerance  Pt will increase AROM to 40/40 wrist and 2\" from palm fingers with composite fist to increase functional use of hand    Pt will follow thru and be independent with HEP   Edema control     LTG\"  Pt will decrease pain 0-1/10 L wrist and hand to increase functional activity tolerance   Pt will increase AROM to [facility] [place] fist to increase functional use of L hand for daily activities   Pt will decrease quick dash below 30 for performance improvement    Patient Requires Follow-up:  [x]  Yes  []  No    Plan: []  Continue per plan of care []  Alter current plan (see comments)   [x]  Plan of care initiated []  Hold pending MD visit []  Discharge    Plan for Next Session:      Electronically signed by:  Omar Benitez OT, OTR/L, 11/16/2022, 11:33 AM

## 2022-11-16 NOTE — PROGRESS NOTES
Occupational Therapy Initial Assessment  Date:  2022    Patient Name: Digna Ornelas  MRN: [id number]     :  1970     Treatment Diagnosis: M79.642  M25.642    Restrictions:  Restrictions/Precautions  Restrictions/Precautions: General Precautions  Required Braces or Orthoses?: No  Subjective:   General  Chart Reviewed: Yes  Additional Pertinent Hx: Pt fell going down basement steps and fractured L distal radius. Has swelling and stiffness  Diagnosis: Intraarticular fx of distal end of radius S52.572D  Referring Provider (secondary): Dr Sofiya Green     Social History: Lives with . 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 Is unable to do right now. Functional Status: Pt is able to do own self care and most daily tasks with R hand with difficulty. Date of onset: 2022   Hand Dominance: Right  Chief complaint:    L wrist and hand stiffness  Pain:   3/10 at rest    Sensation: Tingling in ends of fingers                                     RIGHT                                                                LEFT                             MMT PROM AROM Shoulder AROM PROM MMT      Flexion         Extension         Abduction         Internal Rot. Ext. Rot. Elbow & Forearm        WNL Flex / Ext WNL       WNL Supination -10       WNL Pronation WNL        Wrist        WNL Flexion 15       WNL Extension -5       WNL Ulnar Dev.  10       WNL Radial Dev. 10        Thumb         WNL CMC Radial Abd.  20/25       WNL CMC Palmar Abd 20/32       WNL MP 0/32       WNL IP 0/32       Finger Extension/Flexion   RIGHT=WNL    Index  Long Ring Small    MPs    (    ) (    ) (    ) (    )   PIPs    (    ) (    ) (    ) (    )   DIPs (    ) (    ) (    ) (    )          LEFT    Index  Long Ring Small    MPs 10/40 10/36 10/35 10/35   PIPs 15/36 15/40 22/42 20/46   DIPs 0/10 0/10 0/10 0/15       Hand Strength Right Left      U/A   Lateral Pinch     Hitchcock Pinch Tip Pinch            Edema Right  Left    Hand Volumeter     Circumference: Palm  7 1/4\" 7 1/2\"   Wrist Crease     Base of Index 6.3 7.0   Base of Long 5.8 6.7   Base of Ring 5.6 6.4   Base of Small  5.2 5.8       Other:   Barriers to Learning:            No barriers noted             Assessment:    Assessment  Performance deficits / Impairments: Decreased ROM; Decreased strength  Assessment: Pt presents with painful L wrist with significant edema. Tingling in fingers, Significant decreased ROM. U/A to measure  or strength. Elbow strength WNL  Treatment Diagnosis: M79.642  M25.642  Prognosis: Good;Fair  Decision Making: Medium Complexity  History: PMH: Asthma  Meds: in system  Allergies: latex  Exam: ROM, pain, edema, sensation  Assistance / Modification: None needed: is able to do most things with R dominant hand  Treatment Initiated : Fluidotherapy, MEM, AROM, gentle PROM, tendon gliding.  Issued and instructed in HEP       Plan:    Occupational Therapy Plan  Plan Weeks: 2 x week x 6 week  Current Treatment Recommendations: ROM, Patient/Caregiver education & training, Modalities, Manual Therapy:  STM  Additional Comments: edema control      OutComes Score:  Quick dash: 47.7 States was considering that she can use R hand    Goals:   Pt will decrease pain in L wrist and hand 2-3/10 with use to increase functional activity tolerance  Pt will increase AROM to 40/40 wrist and 2\" from palm fingers with composite fist to increase functional use of hand    Pt will follow thru and be independent with HEP   Edema control    LTG\"  Pt will decrease pain 0-1/10 L wrist and hand to increase functional activity tolerance   Pt will increase AROM to [facility] [place] fist to increase functional use of L hand for daily activities   Pt will decrease quick dash below 30 for performance improvement       Therapy Time:   Individual Concurrent Group Co-treatment   Time In 1015         Time Out 1115         Minutes [address]

## 2022-11-22 ENCOUNTER — HOSPITAL ENCOUNTER (OUTPATIENT)
Dept: OCCUPATIONAL THERAPY | Age: 52
Setting detail: THERAPIES SERIES
Discharge: HOME | End: 2022-11-22
Payer: COMMERCIAL

## 2022-11-22 PROCEDURE — 97140 MANUAL THERAPY 1/> REGIONS: CPT

## 2022-11-22 PROCEDURE — 97110 THERAPEUTIC EXERCISES: CPT

## 2022-11-22 PROCEDURE — 97022 WHIRLPOOL THERAPY: CPT

## 2022-11-22 NOTE — FLOWSHEET NOTE
Occupational Therapy Out Patient Daily Treatment Note     [x][place] [address]      TONO [facility]     [address] [facility] [address] [facility] 23       [facility], [address], [address]       Alexa [address]     [phone]  [phone]  ______________________________________________________________________  Date:  2022  Patient Name:  Debra Schroeder    :  1970  Restrictions/Precautions:  General  Diagnosis:   L DRF  Treatment Diagnosis:  hand and wrist pain and stiffness  Insurance/Certification information:  [employer]  Referring Physician:   Dr Reese Naidu of care signed (Y/N):    Visit# / total visits:  212    Pain level: 3/10 At rest       8/10 with exercise    Subjective: States was really swollen yesterday.  Still very stiff and painful with movement  Prior Level of Function:  Full functional use before injury  Patient Goals: Return to PLOF    Treatment Flowsheet   Right Left   Fluidotherapy  x   MEM  x   AROM wrist and hand  x   PROM wrist and hand  x   Massage fingers with topricin  x                                                                                                       Interventions/Modalities used:  [x] Therapeutic Exercise   [x] Modalities:  [x] Therapeutic Activity    [] Ultrasound [] Elec Stimulation   [] Total Motion Release    [x] Fluido [] Kinesiotaping  [] Neuromuscular Re-education   [] Ionto [] Coldpack/hotpack   [x] Instruction in HEP    Other:edema control    Objective Findings:   Wrist flex 20 ext 5(20)   LEFT      Index  Long Ring Small    MPs 10/40 10/45 10/45 10/45   PIPs 15/40 15/45 22/45 20/50   DIPs 0/10 0/10 0/10 0/15   PROM PIPs                             55            60           65           65  Improved  Communication with other providers: POC to physician    Ed  ucation provided to patient: Issued and instructed in HEP    Adverse Reactions to treatment:none noted    Time in:  930  Time out:  1015  Timed treatment minutes:  25  Total treatment time:  45     If BWC Please Indicate Time In/Out  CPT Code Time In Time Out Total Min                                                                    Treatment/Activity Tolerance:     [x]  Patient tolerated treatment well []  Patient limited by fatique    []  Patient limited by pain []  Patient limited by other medical complications   []  Other:     Goals:   Pt will decrease pain in L wrist and hand 2-3/10 with use to increase functional activity tolerance  Pt will increase AROM to 40/40 wrist and 2\" from palm fingers with composite fist to increase functional use of hand    Pt will follow thru and be independent with HEP   Edema control     LTG\"  Pt will decrease pain 0-1/10 L wrist and hand to increase functional activity tolerance   Pt will increase AROM to [facility] PEMbVisualKE fist to increase functional use of L hand for daily activities   Pt will decrease quick dash below 30 for performance improvement    Patient Requires Follow-up:  [x]  Yes  []  No    Plan: []  Continue per plan of care []  Alter current plan (see comments)   [x]  Plan of care initiated []  Hold pending MD visit []  Discharge    Plan for Next Session:      Electronically signed by:  Dhiraj Li OT, OTR/L, 11/22/2022, 10:37 AM

## 2022-11-25 ENCOUNTER — HOSPITAL ENCOUNTER (OUTPATIENT)
Dept: OCCUPATIONAL THERAPY | Age: 52
Setting detail: THERAPIES SERIES
Discharge: HOME | End: 2022-11-25
Payer: COMMERCIAL

## 2022-11-25 PROCEDURE — 97022 WHIRLPOOL THERAPY: CPT

## 2022-11-25 PROCEDURE — 97110 THERAPEUTIC EXERCISES: CPT

## 2022-11-25 PROCEDURE — 97140 MANUAL THERAPY 1/> REGIONS: CPT

## 2022-11-25 NOTE — FLOWSHEET NOTE
Occupational Therapy Out Patient Daily Treatment Note     [x][place] [address]      TONO [facility]     [address] [facility] [address]. [facility] 23       [facility] [address], [address], [address]       [address]     [phone]  [phone]  ______________________________________________________________________  Date:  2022  Patient Name:  Golden Houser    :  1970  Restrictions/Precautions:  General  Diagnosis:   L DRF  Treatment Diagnosis:  hand and wrist pain and stiffness  Insurance/Certification information:  [employer]  Referring Physician:   Dr Best Matter of care signed (Y/N):    Visit# / total visits:  312    Pain level: 3/10 At rest       8/10 with exercise    Subjective: States was swollen yesterday with cooking.  Gets a shiny appearance  Prior Level of Function:  Full functional use before injury  Patient Goals: Return to PLOF    Treatment Flowsheet   Right Left   Fluidotherapy  x   MEM  x   AROM wrist and hand  x   PROM wrist and hand  x   Massage fingers with topricin  x     Digiflex 1.5#  X with difficulty                                                                                                Interventions/Modalities used:  [x] Therapeutic Exercise   [x] Modalities:  [x] Therapeutic Activity    [] Ultrasound [] Elec Stimulation   [] Total Motion Release    [x] Fluido [] Kinesiotaping  [] Neuromuscular Re-education   [] Ionto [] Coldpack/hotpack   [x] Instruction in HEP    Other:edema control    Objective Findings:   Wrist flex 20 ext 5(20)   LEFT      Index  Long Ring Small    MPs 10/45 10/45 10/45 10/45   PIPs 15/45 15/45 22/45 20/50   DIPs 0/20 0/20 0/20 0/20   PROM PIPs                             55            60           65           65   PROM DIPs to 30  Communication with other providers: POC to physician    Ed  ucation provided to patient: Issued and instructed in HEP    Adverse Reactions to treatment:none noted    Time in:  930  Time out:  1015  Timed treatment minutes:  25  Total treatment time:  45     If BWC Please Indicate Time In/Out  CPT Code Time In Time Out Total Min                                                                    Treatment/Activity Tolerance:     [x]  Patient tolerated treatment well []  Patient limited by fatique    []  Patient limited by pain []  Patient limited by other medical complications   []  Other:     Goals:   Pt will decrease pain in L wrist and hand 2-3/10 with use to increase functional activity tolerance  Pt will increase AROM to 40/40 wrist and 2\" from palm fingers with composite fist to increase functional use of hand    Pt will follow thru and be independent with HEP   Edema control     LTG\"  Pt will decrease pain 0-1/10 L wrist and hand to increase functional activity tolerance   Pt will increase AROM to [facility] fist to increase functional use of L hand for daily activities   Pt will decrease quick dash below 30 for performance improvement    Patient Requires Follow-up:  [x]  Yes  []  No    Plan: [x]  Continue per plan of care []  Alter current plan (see comments)   [x]  Plan of care initiated []  Hold pending MD visit []  Discharge    Plan for Next Session:      Electronically signed by:  Brie Mosley OT, OTR/L, 11/25/2022, 11:16 AM

## 2022-11-29 ENCOUNTER — HOSPITAL ENCOUNTER (OUTPATIENT)
Dept: OCCUPATIONAL THERAPY | Age: 52
Setting detail: THERAPIES SERIES
Discharge: HOME | End: 2022-11-29
Payer: COMMERCIAL

## 2022-11-29 PROCEDURE — 97022 WHIRLPOOL THERAPY: CPT

## 2022-11-29 PROCEDURE — 97140 MANUAL THERAPY 1/> REGIONS: CPT

## 2022-11-29 PROCEDURE — 97110 THERAPEUTIC EXERCISES: CPT

## 2022-11-29 NOTE — FLOWSHEET NOTE
Occupational Therapy Out Patient Daily Treatment Note     [x][place] [address]      TONO [facility]     [address] [facility] [address].  [facility] 23       [facility] 218, [address], [address]       Alexa [address]     [phone]  [phone]  ______________________________________________________________________  Date:  2022  Patient Name:  Suzie Osorio    :  1970  Restrictions/Precautions:  General  Diagnosis:   L DRF  Treatment Diagnosis:  hand and wrist pain and stiffness  Insurance/Certification information:  [employer]  Referring Physician:   Dr Amie Parikh of care signed (Y/N):    Visit# / total visits:      Pain level: 3/10 At rest       8/10 with exercise    Subjective: States is still painful with PROM  Prior Level of Function:  Full functional use before injury  Patient Goals: Return to PLOF    Treatment Flowsheet   Right Left   Fluidotherapy  x   MEM  x   AROM wrist and hand  x   PROM wrist and hand  x   Massage fingers with topricin  x     Digiflex 1.5#  X with difficulty                                                                                                Interventions/Modalities used:  [x] Therapeutic Exercise   [x] Modalities:  [x] Therapeutic Activity    [] Ultrasound [] Elec Stimulation   [] Total Motion Release    [x] Fluido [] Kinesiotaping  [] Neuromuscular Re-education   [] Ionto [] Coldpack/hotpack   [x] Instruction in HEP    Other:edema control    Objective Findings:   Wrist flex 45 ext 5(30)   LEFT      Index  Long Ring Small    MPs 10/45 10/45 10/45 10/45   PIPs 15/60 15/60 22/60 20/65   DIPs 0/20 0/20 0/20 0/20   PROM PIPs                              70           72        70       70  PROM DIPs to 35  Communication with other providers: POC to physician    Ed  ucation provided to patient: Issued and instructed in HEP    Adverse Reactions to treatment:none noted    Time in:  945  Time out:  1045  Timed treatment minutes:  40  Total treatment time:  60     If BWC Please Indicate Time In/Out  CPT Code Time In Time Out Total Min                                                                    Treatment/Activity Tolerance:     [x]  Patient tolerated treatment well []  Patient limited by fatique    []  Patient limited by pain []  Patient limited by other medical complications   []  Other:     Goals:   Pt will decrease pain in L wrist and hand 2-3/10 with use to increase functional activity tolerance  Pt will increase AROM to 40/40 wrist and 2\" from palm fingers with composite fist to increase functional use of hand    Pt will follow thru and be independent with HEP   Edema control     LTG\"  Pt will decrease pain 0-1/10 L wrist and hand to increase functional activity tolerance   Pt will increase AROM to [facility]EMClaimReturnKE fist to increase functional use of L hand for daily activities   Pt will decrease quick dash below 30 for performance improvement    Patient Requires Follow-up:  [x]  Yes  []  No    Plan: [x]  Continue per plan of care []  Alter current plan (see comments)   [x]  Plan of care initiated []  Hold pending MD visit []  Discharge    Plan for Next Session:      Electronically signed by:  Satinder Ceron OT, OTR/L, 11/29/2022, 10:55 AM

## 2023-01-19 ENCOUNTER — OFFICE VISIT (OUTPATIENT)
Dept: FAMILY MEDICINE CLINIC | Age: 53
End: 2023-01-19

## 2023-01-19 VITALS
WEIGHT: 181.2 LBS | DIASTOLIC BLOOD PRESSURE: 84 MMHG | SYSTOLIC BLOOD PRESSURE: 130 MMHG | HEIGHT: 66 IN | BODY MASS INDEX: 29.12 KG/M2 | HEART RATE: 88 BPM | OXYGEN SATURATION: 98 %

## 2023-01-19 DIAGNOSIS — E66.3 OVERWEIGHT: ICD-10-CM

## 2023-01-19 DIAGNOSIS — Z76.89 ENCOUNTER TO ESTABLISH CARE WITH NEW DOCTOR: Primary | ICD-10-CM

## 2023-01-19 DIAGNOSIS — F41.9 ANXIETY: ICD-10-CM

## 2023-01-19 DIAGNOSIS — F41.0 PANIC DISORDER: ICD-10-CM

## 2023-01-19 DIAGNOSIS — Z12.11 SCREEN FOR COLON CANCER: ICD-10-CM

## 2023-01-19 DIAGNOSIS — J45.20 MILD INTERMITTENT ASTHMA, UNSPECIFIED WHETHER COMPLICATED: ICD-10-CM

## 2023-01-19 DIAGNOSIS — S52.572D OTHER CLOSED INTRA-ARTICULAR FRACTURE OF DISTAL END OF LEFT RADIUS WITH ROUTINE HEALING, SUBSEQUENT ENCOUNTER: ICD-10-CM

## 2023-01-19 RX ORDER — LORAZEPAM 0.5 MG/1
0.5 TABLET ORAL EVERY 8 HOURS PRN
Qty: 30 TABLET | Refills: 0 | Status: SHIPPED | OUTPATIENT
Start: 2023-01-19 | End: 2023-02-18

## 2023-01-19 ASSESSMENT — PATIENT HEALTH QUESTIONNAIRE - PHQ9
SUM OF ALL RESPONSES TO PHQ QUESTIONS 1-9: 0
SUM OF ALL RESPONSES TO PHQ QUESTIONS 1-9: 0
1. LITTLE INTEREST OR PLEASURE IN DOING THINGS: 0
SUM OF ALL RESPONSES TO PHQ QUESTIONS 1-9: 0
SUM OF ALL RESPONSES TO PHQ9 QUESTIONS 1 & 2: 0
2. FEELING DOWN, DEPRESSED OR HOPELESS: 0
SUM OF ALL RESPONSES TO PHQ QUESTIONS 1-9: 0

## 2023-01-19 NOTE — PROGRESS NOTES
1/19/2023    [facility]    Chief Complaint   Patient presents with    Established New Doctor     Prev. Pt of Dr. Annamaria Avila     Other     Pt states has been prescribed Ativan in past , would like refill no longer on pt's med list       HPI  History was obtained from pt. Chandler is a 45 y.o. female with a PMHx of pt    Anxiety    Asthma    Closed fracture of lower end of left radius with routine healing      who presents today to establish care. Anxiety    Asthma - albuterol and fluticasone     Closed fracture of lower end of left radius with routine healing   Postmenopausal - see [address] [place] [address] for allergy and asthma  Dr Tobias Douglas for ortho    Acute Concerns:  Panic attacks in the past, nothing recent  Pt does have occasional anxiety and used to get 15 of them at a time. Family History:  Cancer - mother had thyroid, lung, breast and uterine cancer. Recent Labs:  Nothing recently    Care Gaps:  Colonoscopy - 2021 normal 10 year follow up  Pap smear and Mammo UTD - pt sees José Antonio    1. Encounter to establish care with new doctor    2. Panic disorder    3. Screen for colon cancer    4. Mild intermittent asthma, unspecified whether complicated    5. Other closed intra-articular fracture of distal end of left radius with routine healing, subsequent encounter    6. Anxiety    7.  Overweight         REVIEW OF SYMPTOMS    Review of Systems    PAST MEDICAL HISTORY  Past Medical History:   Diagnosis Date    Anxiety     Asthma        FAMILY HISTORY  Family History   Problem Relation Age of Onset    Cancer Mother        SOCIAL HISTORY  Social History     Socioeconomic History    Marital status:      Spouse name: None    Number of children: None    Years of education: None    Highest education level: None   Tobacco Use    Smoking status: Former    Smokeless tobacco: Never   Substance and Sexual Activity    Alcohol use: Yes     Comment: occ    Drug use: Never        SURGICAL HISTORY  No past surgical history on file.            CURRENT MEDICATIONS  Current Outpatient Medications   Medication Sig Dispense Refill    LORazepam (ATIVAN) 0.5 MG tablet Take 1 tablet by mouth every 8 hours as needed for Anxiety for up to 30 days. Max Daily Amount: 1.5 mg 30 tablet 0    calcium-vitamin D (OSCAL) 250-125 MG-UNIT per tablet Take 2 tablets by mouth daily      fluticasone (ARNUITY ELLIPTA) 100 MCG/ACT AEPB Inhale into the lungs 1 time a day 1 each 3    albuterol sulfate  (90 Base) MCG/ACT inhaler Inhale 2 puffs into the lungs every 6 hours as needed for Wheezing 1 Inhaler 3     No current facility-administered medications for this visit. ALLERGIES  Allergies   Allergen Reactions    Latex Itching and Rash       PHYSICAL EXAM    /84   Pulse 88   Ht 5' 6\" (1.676 m)   Wt 181 lb 3.2 oz (82.2 kg)   SpO2 98%   BMI 29.25 kg/m²     Physical Exam  Constitutional:       Appearance: Normal appearance. She is normal weight. HENT:      Head: Normocephalic and atraumatic. Right Ear: Tympanic membrane and external ear normal.      Left Ear: Tympanic membrane and external ear normal.      Nose: No rhinorrhea. Mouth/Throat:      Mouth: Mucous membranes are moist.      Pharynx: Oropharynx is clear. No oropharyngeal exudate or posterior oropharyngeal erythema. Eyes:      General: No scleral icterus. Extraocular Movements: Extraocular movements intact. Conjunctiva/sclera: Conjunctivae normal.      Pupils: Pupils are equal, round, and reactive to light. Cardiovascular:      Rate and Rhythm: Normal rate and regular rhythm. Pulses: Normal pulses. Heart sounds: Normal heart sounds. No murmur heard. No friction rub. No gallop. Pulmonary:      Effort: Pulmonary effort is normal.      Breath sounds: Normal breath sounds. No wheezing, rhonchi or rales. Abdominal:      General: Bowel sounds are normal. There is no distension. Palpations: Abdomen is soft. There is no mass. Tenderness:  There is no abdominal tenderness. There is no right CVA tenderness, left CVA tenderness, guarding or rebound. Musculoskeletal:         General: Deformity present. Cervical back: Normal range of motion and neck supple. No rigidity. No muscular tenderness. Right lower leg: No edema. Left lower leg: No edema. Comments: Soft tissue atrophy the distal left forearm, decreased strength and range of motion of gripping and flexing of the hand, patient cannot apposition fingers or make a fist   Skin:     General: Skin is warm and dry. Capillary Refill: Capillary refill takes less than 2 seconds. Findings: No bruising, erythema or rash. Neurological:      General: No focal deficit present. Mental Status: She is alert and oriented to person, place, and time. Coordination: Coordination normal.      Gait: Gait normal.   Psychiatric:         Mood and Affect: Mood normal.         Behavior: Behavior normal.       ASSESSMENT & PLAN    1. Encounter to establish care with new doctor  Intersted in shingles shots  Needs fasting labs soon  Up-to-date on colon, cervical and breast cancer screening    2. Panic disorder  Intermittent, well controlled with the occasional Ativan, does not take it regularly  - LORazepam (ATIVAN) 0.5 MG tablet; Take 1 tablet by mouth every 8 hours as needed for Anxiety for up to 30 days. Max Daily Amount: 1.5 mg  Dispense: 30 tablet; Refill: 0  - TSH with Reflex; Future    3. Screen for colon cancer  Normal colonoscopy last year    4. Mild intermittent asthma, unspecified whether complicated  On 2 inhalers, follows with Pastor Lobato    5. Other closed intra-articular fracture of distal end of left radius with routine healing, subsequent encounter    Following with Dr. Tomy Salcido, likely needs more therapy    6. Anxiety    - LORazepam (ATIVAN) 0.5 MG tablet; Take 1 tablet by mouth every 8 hours as needed for Anxiety for up to 30 days.  Max Daily Amount: 1.5 mg  Dispense: 30 tablet; Refill: 0  - TSH with Reflex; Future    7. Overweight    - CBC with Auto Differential; Future  - Comprehensive Metabolic Panel; Future  - Lipid, Fasting; Future    Return in about 6 months (around 7/19/2023). Electronically signed by Prachi Krishnamurthy on 1/19/2023      Comment: Please note this report has been produced using speech recognition software and may contain errors related to that system including errors in grammar, punctuation, and spelling, as well as words and phrases that may be inappropriate. If there are any questions or concerns please feel free to contact the dictating provider for clarification.

## 2025-07-17 RX ORDER — MULTIVITAMIN WITH IRON
1 TABLET ORAL DAILY
COMMUNITY

## 2025-07-17 RX ORDER — LORAZEPAM 0.5 MG/1
0.5 TABLET ORAL
COMMUNITY